# Patient Record
Sex: MALE | Race: WHITE | NOT HISPANIC OR LATINO | Employment: OTHER | URBAN - METROPOLITAN AREA
[De-identification: names, ages, dates, MRNs, and addresses within clinical notes are randomized per-mention and may not be internally consistent; named-entity substitution may affect disease eponyms.]

---

## 2017-01-30 ENCOUNTER — RECORDS - HEALTHEAST (OUTPATIENT)
Dept: LAB | Facility: CLINIC | Age: 77
End: 2017-01-30

## 2017-01-30 LAB
CHOLEST SERPL-MCNC: 134 MG/DL
FASTING STATUS PATIENT QL REPORTED: NORMAL
HDLC SERPL-MCNC: 42 MG/DL
LDLC SERPL CALC-MCNC: 77 MG/DL
TRIGL SERPL-MCNC: 74 MG/DL

## 2017-08-02 ENCOUNTER — RECORDS - HEALTHEAST (OUTPATIENT)
Dept: LAB | Facility: CLINIC | Age: 77
End: 2017-08-02

## 2017-08-02 LAB
CHOLEST SERPL-MCNC: 126 MG/DL
FASTING STATUS PATIENT QL REPORTED: ABNORMAL
HDLC SERPL-MCNC: 35 MG/DL
LDLC SERPL CALC-MCNC: 74 MG/DL
TRIGL SERPL-MCNC: 85 MG/DL

## 2018-01-05 ENCOUNTER — RECORDS - HEALTHEAST (OUTPATIENT)
Dept: LAB | Facility: CLINIC | Age: 78
End: 2018-01-05

## 2018-01-05 LAB — PSA SERPL-MCNC: 1.1 NG/ML (ref 0–6.5)

## 2018-01-10 ENCOUNTER — RECORDS - HEALTHEAST (OUTPATIENT)
Dept: LAB | Facility: CLINIC | Age: 78
End: 2018-01-10

## 2018-01-10 LAB
CHOLEST SERPL-MCNC: 132 MG/DL
FASTING STATUS PATIENT QL REPORTED: NORMAL
HDLC SERPL-MCNC: 40 MG/DL
LDLC SERPL CALC-MCNC: 76 MG/DL
TRIGL SERPL-MCNC: 82 MG/DL

## 2018-07-10 ENCOUNTER — RECORDS - HEALTHEAST (OUTPATIENT)
Dept: LAB | Facility: CLINIC | Age: 78
End: 2018-07-10

## 2018-07-10 LAB
ALBUMIN SERPL-MCNC: 3.9 G/DL (ref 3.5–5)
ALP SERPL-CCNC: 66 U/L (ref 45–120)
ALT SERPL W P-5'-P-CCNC: 35 U/L (ref 0–45)
ANION GAP SERPL CALCULATED.3IONS-SCNC: 7 MMOL/L (ref 5–18)
AST SERPL W P-5'-P-CCNC: 25 U/L (ref 0–40)
BILIRUB SERPL-MCNC: 1.2 MG/DL (ref 0–1)
BUN SERPL-MCNC: 28 MG/DL (ref 8–28)
CALCIUM SERPL-MCNC: 9.6 MG/DL (ref 8.5–10.5)
CHLORIDE BLD-SCNC: 107 MMOL/L (ref 98–107)
CHOLEST SERPL-MCNC: 131 MG/DL
CO2 SERPL-SCNC: 26 MMOL/L (ref 22–31)
CREAT SERPL-MCNC: 0.85 MG/DL (ref 0.7–1.3)
FASTING STATUS PATIENT QL REPORTED: YES
GFR SERPL CREATININE-BSD FRML MDRD: >60 ML/MIN/1.73M2
GLUCOSE BLD-MCNC: 91 MG/DL (ref 70–125)
HDLC SERPL-MCNC: 41 MG/DL
LDLC SERPL CALC-MCNC: 75 MG/DL
POTASSIUM BLD-SCNC: 4.6 MMOL/L (ref 3.5–5)
PROT SERPL-MCNC: 6.8 G/DL (ref 6–8)
SODIUM SERPL-SCNC: 140 MMOL/L (ref 136–145)
TRIGL SERPL-MCNC: 76 MG/DL

## 2018-12-13 ENCOUNTER — RECORDS - HEALTHEAST (OUTPATIENT)
Dept: ADMINISTRATIVE | Facility: OTHER | Age: 78
End: 2018-12-13

## 2018-12-13 ENCOUNTER — HOSPITAL ENCOUNTER (OUTPATIENT)
Dept: CT IMAGING | Facility: HOSPITAL | Age: 78
Discharge: HOME | End: 2018-12-13
Attending: FAMILY MEDICINE

## 2018-12-13 DIAGNOSIS — R42 VERTIGO: ICD-10-CM

## 2019-01-03 ENCOUNTER — RECORDS - HEALTHEAST (OUTPATIENT)
Dept: LAB | Facility: CLINIC | Age: 79
End: 2019-01-03

## 2019-01-03 LAB
ANION GAP SERPL CALCULATED.3IONS-SCNC: 9 MMOL/L (ref 5–18)
BUN SERPL-MCNC: 17 MG/DL (ref 8–28)
CALCIUM SERPL-MCNC: 9.6 MG/DL (ref 8.5–10.5)
CHLORIDE BLD-SCNC: 104 MMOL/L (ref 98–107)
CHOLEST SERPL-MCNC: 141 MG/DL
CO2 SERPL-SCNC: 27 MMOL/L (ref 22–31)
CREAT SERPL-MCNC: 0.85 MG/DL (ref 0.7–1.3)
FASTING STATUS PATIENT QL REPORTED: NORMAL
GFR SERPL CREATININE-BSD FRML MDRD: >60 ML/MIN/1.73M2
GLUCOSE BLD-MCNC: 88 MG/DL (ref 70–125)
HDLC SERPL-MCNC: 42 MG/DL
LDLC SERPL CALC-MCNC: 80 MG/DL
POTASSIUM BLD-SCNC: 4.2 MMOL/L (ref 3.5–5)
SODIUM SERPL-SCNC: 140 MMOL/L (ref 136–145)
TRIGL SERPL-MCNC: 93 MG/DL

## 2019-01-10 ENCOUNTER — RECORDS - HEALTHEAST (OUTPATIENT)
Dept: LAB | Facility: CLINIC | Age: 79
End: 2019-01-10

## 2019-01-10 LAB — TSH SERPL DL<=0.005 MIU/L-ACNC: 1.02 UIU/ML (ref 0.3–5)

## 2019-08-02 ENCOUNTER — RECORDS - HEALTHEAST (OUTPATIENT)
Dept: LAB | Facility: CLINIC | Age: 79
End: 2019-08-02

## 2019-08-02 LAB
ANION GAP SERPL CALCULATED.3IONS-SCNC: 5 MMOL/L (ref 5–18)
BUN SERPL-MCNC: 26 MG/DL (ref 8–28)
CALCIUM SERPL-MCNC: 10.1 MG/DL (ref 8.5–10.5)
CHLORIDE BLD-SCNC: 104 MMOL/L (ref 98–107)
CHOLEST SERPL-MCNC: 125 MG/DL
CO2 SERPL-SCNC: 30 MMOL/L (ref 22–31)
CREAT SERPL-MCNC: 0.99 MG/DL (ref 0.7–1.3)
FASTING STATUS PATIENT QL REPORTED: ABNORMAL
GFR SERPL CREATININE-BSD FRML MDRD: >60 ML/MIN/1.73M2
GLUCOSE BLD-MCNC: 93 MG/DL (ref 70–125)
HDLC SERPL-MCNC: 36 MG/DL
LDLC SERPL CALC-MCNC: 71 MG/DL
POTASSIUM BLD-SCNC: 4.8 MMOL/L (ref 3.5–5)
SODIUM SERPL-SCNC: 139 MMOL/L (ref 136–145)
TRIGL SERPL-MCNC: 92 MG/DL

## 2020-02-19 ENCOUNTER — RECORDS - HEALTHEAST (OUTPATIENT)
Dept: LAB | Facility: CLINIC | Age: 80
End: 2020-02-19

## 2020-02-19 LAB
ANION GAP SERPL CALCULATED.3IONS-SCNC: 10 MMOL/L (ref 5–18)
BUN SERPL-MCNC: 20 MG/DL (ref 8–28)
CALCIUM SERPL-MCNC: 9.7 MG/DL (ref 8.5–10.5)
CHLORIDE BLD-SCNC: 100 MMOL/L (ref 98–107)
CHOLEST SERPL-MCNC: 138 MG/DL
CO2 SERPL-SCNC: 26 MMOL/L (ref 22–31)
CREAT SERPL-MCNC: 0.9 MG/DL (ref 0.7–1.3)
FASTING STATUS PATIENT QL REPORTED: YES
GFR SERPL CREATININE-BSD FRML MDRD: >60 ML/MIN/1.73M2
GLUCOSE BLD-MCNC: 90 MG/DL (ref 70–125)
HDLC SERPL-MCNC: 39 MG/DL
HGB BLD-MCNC: 13.5 G/DL (ref 14–18)
LDLC SERPL CALC-MCNC: 80 MG/DL
POTASSIUM BLD-SCNC: 4.2 MMOL/L (ref 3.5–5)
SODIUM SERPL-SCNC: 136 MMOL/L (ref 136–145)
TRIGL SERPL-MCNC: 93 MG/DL

## 2020-04-10 ENCOUNTER — APPOINTMENT (OUTPATIENT)
Dept: CT IMAGING | Facility: CLINIC | Age: 80
DRG: 086 | End: 2020-04-10
Payer: MEDICARE

## 2020-04-10 ENCOUNTER — HOSPITAL ENCOUNTER (INPATIENT)
Facility: CLINIC | Age: 80
LOS: 1 days | Discharge: HOME | DRG: 086 | End: 2020-04-11
Attending: EMERGENCY MEDICINE | Admitting: SURGERY
Payer: MEDICARE

## 2020-04-10 DIAGNOSIS — S06.5XAA SUBDURAL HEMATOMA (H): ICD-10-CM

## 2020-04-10 DIAGNOSIS — S06.5XAA SDH (SUBDURAL HEMATOMA) (H): Primary | ICD-10-CM
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
## 2020-04-10 DIAGNOSIS — S20.212A RIB CONTUSION, LEFT, INITIAL ENCOUNTER: ICD-10-CM

## 2020-04-10 LAB — GLUCOSE BLDC GLUCOMTR-MCNC: 135 MG/DL (ref 70–99)

## 2020-04-10 PROCEDURE — 70450 CT HEAD/BRAIN W/O DYE: CPT

## 2020-04-10 PROCEDURE — 99221 1ST HOSP IP/OBS SF/LOW 40: CPT | Mod: GC | Performed by: INTERNAL MEDICINE

## 2020-04-10 PROCEDURE — 99285 EMERGENCY DEPT VISIT HI MDM: CPT | Mod: 25

## 2020-04-10 PROCEDURE — 00000146 ZZHCL STATISTIC GLUCOSE BY METER IP

## 2020-04-10 PROCEDURE — 25000132 ZZH RX MED GY IP 250 OP 250 PS 637: Mod: GY | Performed by: STUDENT IN AN ORGANIZED HEALTH CARE EDUCATION/TRAINING PROGRAM

## 2020-04-10 PROCEDURE — 25800030 ZZH RX IP 258 OP 636: Performed by: SURGERY

## 2020-04-10 PROCEDURE — 87641 MR-STAPH DNA AMP PROBE: CPT | Performed by: SURGERY

## 2020-04-10 PROCEDURE — 99291 CRITICAL CARE FIRST HOUR: CPT | Mod: Z6 | Performed by: EMERGENCY MEDICINE

## 2020-04-10 PROCEDURE — 87640 STAPH A DNA AMP PROBE: CPT | Performed by: SURGERY

## 2020-04-10 PROCEDURE — 20000004 ZZH R&B ICU UMMC

## 2020-04-10 RX ORDER — NALOXONE HYDROCHLORIDE 0.4 MG/ML
.1-.4 INJECTION, SOLUTION INTRAMUSCULAR; INTRAVENOUS; SUBCUTANEOUS
Status: DISCONTINUED | OUTPATIENT
Start: 2020-04-10 | End: 2020-04-11 | Stop reason: HOSPADM

## 2020-04-10 RX ORDER — HYDROCHLOROTHIAZIDE 12.5 MG/1
12.5 TABLET ORAL DAILY
COMMUNITY

## 2020-04-10 RX ORDER — MULTIPLE VITAMINS W/ MINERALS TAB 9MG-400MCG
1 TAB ORAL EVERY EVENING
Status: DISCONTINUED | OUTPATIENT
Start: 2020-04-10 | End: 2020-04-11 | Stop reason: HOSPADM

## 2020-04-10 RX ORDER — INDOMETHACIN 50 MG/1
50 CAPSULE ORAL PRN
COMMUNITY

## 2020-04-10 RX ORDER — ATORVASTATIN CALCIUM 20 MG/1
20 TABLET, FILM COATED ORAL DAILY
COMMUNITY

## 2020-04-10 RX ORDER — ASCORBIC ACID 500 MG
500 TABLET ORAL EVERY EVENING
Status: DISCONTINUED | OUTPATIENT
Start: 2020-04-10 | End: 2020-04-11 | Stop reason: HOSPADM

## 2020-04-10 RX ORDER — MULTIPLE VITAMINS W/ MINERALS TAB 9MG-400MCG
1 TAB ORAL DAILY
COMMUNITY

## 2020-04-10 RX ORDER — ONDANSETRON 4 MG/1
4 TABLET, ORALLY DISINTEGRATING ORAL EVERY 6 HOURS PRN
Status: DISCONTINUED | OUTPATIENT
Start: 2020-04-10 | End: 2020-04-11 | Stop reason: HOSPADM

## 2020-04-10 RX ORDER — MULTIVIT-MIN/IRON/FOLIC ACID/K 18-600-40
1 CAPSULE ORAL DAILY
COMMUNITY

## 2020-04-10 RX ORDER — TIMOLOL 5 MG/ML
1 SOLUTION/ DROPS OPHTHALMIC DAILY
COMMUNITY

## 2020-04-10 RX ORDER — ONDANSETRON 2 MG/ML
4 INJECTION INTRAMUSCULAR; INTRAVENOUS EVERY 6 HOURS PRN
Status: DISCONTINUED | OUTPATIENT
Start: 2020-04-10 | End: 2020-04-11 | Stop reason: HOSPADM

## 2020-04-10 RX ORDER — ATORVASTATIN CALCIUM 20 MG/1
20 TABLET, FILM COATED ORAL AT BEDTIME
Status: DISCONTINUED | OUTPATIENT
Start: 2020-04-10 | End: 2020-04-11 | Stop reason: HOSPADM

## 2020-04-10 RX ORDER — VERAPAMIL HYDROCHLORIDE 180 MG/1
360 CAPSULE, EXTENDED RELEASE ORAL DAILY
COMMUNITY

## 2020-04-10 RX ORDER — SODIUM CHLORIDE 9 MG/ML
INJECTION, SOLUTION INTRAVENOUS CONTINUOUS
Status: DISCONTINUED | OUTPATIENT
Start: 2020-04-10 | End: 2020-04-11

## 2020-04-10 RX ADMIN — OXYCODONE HYDROCHLORIDE AND ACETAMINOPHEN 500 MG: 500 TABLET ORAL at 23:01

## 2020-04-10 RX ADMIN — MULTIPLE VITAMINS W/ MINERALS TAB 1 TABLET: TAB at 23:00

## 2020-04-10 RX ADMIN — SODIUM CHLORIDE: 9 INJECTION, SOLUTION INTRAVENOUS at 23:48

## 2020-04-10 RX ADMIN — ATORVASTATIN CALCIUM 20 MG: 20 TABLET, FILM COATED ORAL at 23:00

## 2020-04-10 ASSESSMENT — MIFFLIN-ST. JEOR: SCORE: 1618.25

## 2020-04-10 NOTE — ED PROVIDER NOTES
History     Chief Complaint   Patient presents with     Head Injury     known subdural hematoma from fall earlier this afternoon     HPI  Renan Lemus is a 79 year old male with a past medical history of atrial fibrillation who presents to the emergency department with a chief complaint of fall.  The patient was previously seen at [facility] emergency department after a fall from standing height today while raking leaves.  The patient reportedly landed on his left side and did hit his head.  No loss of consciousness reported.  The patient is anticoagulated on Eliquis due to a history of atrial fibrillation.  The patient was noted to have some left-sided rib pain and mild head pain.  Neurologic exam was normal at [facility] and patient was noted to be alert and oriented without nausea or vomiting.  However, a head CT revealed a small (5 mm) subdural hematoma.  CT of the abdomen and pelvis and FAST exam revealed no additional injuries.  The patient has no additional complaints at this time.  Neurosurgery/trauma service were not notified prior to transfer.  Blood pressure 136/70.  Heart rate 98, patient noted to have SPO2 of 99% on room air.  No medications were given prior to his arrival here.  Labs were noted to be unremarkable.    Last dose of Eliquis was at 8 AM today.    I have reviewed the Medications, Allergies, Past Medical and Surgical History, and Social History in the Epic system.    No past medical history on file.  No past surgical history on file.  No current facility-administered medications for this encounter.      No current outpatient medications on file.     Allergies not on file  Past medical history, past surgical history, medications, and allergies were reviewed with the patient. Additional pertinent items: None    Social History     Socioeconomic History     Marital status: Not on file     Spouse name: Not on file     Number of children: Not on file     Years of education: Not on file      Highest education level: Not on file   Occupational History     Not on file   Social Needs     Financial resource strain: Not on file     Food insecurity     Worry: Not on file     Inability: Not on file     Transportation needs     Medical: Not on file     Non-medical: Not on file   Tobacco Use     Smoking status: Not on file   Substance and Sexual Activity     Alcohol use: Not on file     Drug use: Not on file     Sexual activity: Not on file   Lifestyle     Physical activity     Days per week: Not on file     Minutes per session: Not on file     Stress: Not on file   Relationships     Social connections     Talks on phone: Not on file     Gets together: Not on file     Attends [employer] service: Not on file     Active member of club or organization: Not on file     Attends meetings of clubs or organizations: Not on file     Relationship status: Not on file     Intimate partner violence     Fear of current or ex partner: Not on file     Emotionally abused: Not on file     Physically abused: Not on file     Forced sexual activity: Not on file   Other Topics Concern     Not on file   Social History Narrative     Not on file     Social history was reviewed with the patient. Additional pertinent items: None    Review of Systems  General: No fevers or chills  Skin: No rash or diaphoresis  Eyes: No eye redness or discharge  Ears/Nose/Throat: No rhinorrhea or nasal congestion  Respiratory: No cough or SOB  Cardiovascular: No chest pain or palpitations  Gastrointestinal: No nausea, vomiting, or diarrhea  Genitourinary: No urinary frequency, hematuria, or dysuria  Musculoskeletal: No arthralgias or myalgias  Neurologic: No numbness or weakness  Psychiatric: No depression or SI  Hematologic/Lymphatic/Immunologic: No leg swelling, no easy bruising/bleeding  Endocrine: No polyuria/polydypsia    A complete review of systems was performed with pertinent positives and negatives noted in the HPI, and all other systems  negative.    Physical Exam   BP: (!) 140/81  Heart Rate: 103  Temp: 98.8  F (37.1  C)  SpO2: 98 %      General: Well nourished, well developed, NAD  HEENT: EOMI, anicteric. NCAT, MMM  Neck: no jugular venous distension, supple, nl ROM  Cardiac: Regular rate and rhythm. No murmurs, rubs, or gallops. Normal S1, S2.  Intact peripheral pulses  Pulm: CTAB, no stridor, wheezes, rales, rhonchi  Abd: Soft, nontender, nondistended.  No masses palpated.    Skin: Warm and dry to the touch.  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 PERRL, EOMI, visual fields intact on right, impaired on left due to left eye vision deficit (at baseline per patient).  No pronator drift, no lower extremity drift.  Speech is clear without aphasia or dysarthria.      ED Course        Procedures                Critical Care Addendum    My initial assessment, based on my review of prehospital provider report, review of nursing observations and and CT results, established that Renan Lemus has severe traumatic injuries, which requires immediate intervention, and therefore he is critically ill.     After the initial assessment, the care team consulted with neurosurgery team to provide stabilization care. Due to the critical nature of this patient, I reassessed nursing observations, vital signs, physical exam, mental status and neurologic status multiple times prior to his disposition.     Time also spent performing documentation, reviewing test results, discussion with consultants and coordination of care.     Critical care time (excluding teaching time and procedures): 35  minutes.   Trauma:  Level of trauma activation: Trauma evaluation (consult) called at 1900  C-collar and immobilization: not indicated, cleared.  CSpine Clearance: Images at outside hospital  GCS at arrival: 15  GCS at disposition: unchanged  Full Primary and Secondary survey with appropriate immobilization of spine completed in exam section.  Consults prior to admission or transfer: Neurosurgery called at 1900  Procedures done in the ED: none          Labs Ordered and Resulted from Time of ED Arrival Up to the Time of Departure from the ED - No data to display         No results found for this or any previous visit (from the past 24 hour(s)).    Labs, vital signs, and imaging studies were reviewed by me.    Medications - No data to display    Assessments & Plan (with Medical Decision Making)   Renan Lemus is a 79 year old male who presents to the emergency department after a fall.  Found to have a 5 mm subdural hematoma at [facility] emergency department. Neurologic exam is at patient's baseline. BP on arrival 140/81.    CD of images sent by [facility] ER, this will be uploaded into our system.     1907 patient was discussed with neurosurgery service, they will come evaluate the patient in the emergency department.    1918 patient was discussed with trauma surgery, they will come evaluate the patient in the emergency department.    I have reviewed the nursing notes.    I have reviewed the findings, diagnosis, plan and need for follow up with the patient.    2038 Patient discussed with trauma service, to be admitted to their service for further management. Plan was discussed with patient who understands and agrees with plan.     New Prescriptions    No medications on file       Final diagnoses:   Subdural hematoma (H)   Fall, initial encounter   Activities involving raking leaves   Rib contusion, left, initial encounter       4/10/2020   [facility], [place], EMERGENCY DEPARTMENT     Nathalie Mesa MD  04/10/20  2038

## 2020-04-10 NOTE — ED NOTES
Bed: ED23  Expected date: 4/10/20  Expected time: 6:45 PM  Means of arrival:   Comments:  Renan Lemus 79 M  [birth year] [facility] while raking leaves, landed on L side, has small SDH, neuro intact, minimal HA, normal neuro exam, rib pain, FAST and CT neg, no other injuries. Tx from [facility]    5 mm SDH. Takes Eliquis. No LOC. 136/70, HR 98 99% on RA, Alert and oriented, no N/V, labs stable. 18 g left AC. No meds given.

## 2020-04-10 NOTE — ED TRIAGE NOTES
Pt BIBA from [place]. Doing yard work around noon. Fell hit head on rocks. Takes eliquis. Went into hospital. Found subdural hematoma. No loss of consciousness. No neuro changes. No nausea/dizziness. Small amount of bleeding on back of head. BPs 140s systolic. Alert and oriented. A-fib.

## 2020-04-11 ENCOUNTER — APPOINTMENT (OUTPATIENT)
Dept: CT IMAGING | Facility: CLINIC | Age: 80
DRG: 086 | End: 2020-04-11
Payer: MEDICARE

## 2020-04-11 VITALS
OXYGEN SATURATION: 94 % | WEIGHT: 197.75 LBS | HEIGHT: 70 IN | SYSTOLIC BLOOD PRESSURE: 132 MMHG | HEART RATE: 79 BPM | RESPIRATION RATE: 16 BRPM | DIASTOLIC BLOOD PRESSURE: 64 MMHG | BODY MASS INDEX: 28.31 KG/M2 | TEMPERATURE: 98.1 F

## 2020-04-11 LAB
ANION GAP SERPL CALCULATED.3IONS-SCNC: 6 MMOL/L (ref 3–14)
BUN SERPL-MCNC: 18 MG/DL (ref 7–30)
CALCIUM SERPL-MCNC: 8.8 MG/DL (ref 8.5–10.1)
CHLORIDE SERPL-SCNC: 102 MMOL/L (ref 94–109)
CO2 SERPL-SCNC: 26 MMOL/L (ref 20–32)
CREAT SERPL-MCNC: 0.96 MG/DL (ref 0.66–1.25)
GFR SERPL CREATININE-BSD FRML MDRD: 75 ML/MIN/{1.73_M2}
GLUCOSE SERPL-MCNC: 124 MG/DL (ref 70–99)
MRSA DNA SPEC QL NAA+PROBE: NEGATIVE
POTASSIUM SERPL-SCNC: 3.4 MMOL/L (ref 3.4–5.3)
RADIOLOGIST FLAGS: ABNORMAL
SODIUM SERPL-SCNC: 133 MMOL/L (ref 133–144)
SPECIMEN SOURCE: NORMAL

## 2020-04-11 PROCEDURE — 25000132 ZZH RX MED GY IP 250 OP 250 PS 637: Mod: GY | Performed by: NURSE PRACTITIONER

## 2020-04-11 PROCEDURE — 25000125 ZZHC RX 250: Performed by: STUDENT IN AN ORGANIZED HEALTH CARE EDUCATION/TRAINING PROGRAM

## 2020-04-11 PROCEDURE — 40000893 ZZH STATISTIC PT IP EVAL DEFER

## 2020-04-11 PROCEDURE — 80048 BASIC METABOLIC PNL TOTAL CA: CPT | Performed by: STUDENT IN AN ORGANIZED HEALTH CARE EDUCATION/TRAINING PROGRAM

## 2020-04-11 PROCEDURE — 70450 CT HEAD/BRAIN W/O DYE: CPT

## 2020-04-11 PROCEDURE — 25000132 ZZH RX MED GY IP 250 OP 250 PS 637: Mod: GY | Performed by: STUDENT IN AN ORGANIZED HEALTH CARE EDUCATION/TRAINING PROGRAM

## 2020-04-11 PROCEDURE — 99233 SBSQ HOSP IP/OBS HIGH 50: CPT | Performed by: NURSE PRACTITIONER

## 2020-04-11 PROCEDURE — 36415 COLL VENOUS BLD VENIPUNCTURE: CPT | Performed by: STUDENT IN AN ORGANIZED HEALTH CARE EDUCATION/TRAINING PROGRAM

## 2020-04-11 RX ORDER — PROPOFOL 10 MG/ML
INJECTION, EMULSION INTRAVENOUS
Status: DISCONTINUED
Start: 2020-04-11 | End: 2020-04-11 | Stop reason: CLARIF

## 2020-04-11 RX ORDER — ACETAMINOPHEN 325 MG/1
650 TABLET ORAL EVERY 4 HOURS PRN
Status: DISCONTINUED | OUTPATIENT
Start: 2020-04-11 | End: 2020-04-11 | Stop reason: HOSPADM

## 2020-04-11 RX ORDER — INDOMETHACIN 50 MG/1
50 CAPSULE ORAL DAILY
Status: DISCONTINUED | OUTPATIENT
Start: 2020-04-11 | End: 2020-04-11

## 2020-04-11 RX ORDER — VERAPAMIL HYDROCHLORIDE 40 MG/1
40 TABLET ORAL EVERY 6 HOURS
Status: DISCONTINUED | OUTPATIENT
Start: 2020-04-11 | End: 2020-04-11 | Stop reason: HOSPADM

## 2020-04-11 RX ORDER — VITAMIN B COMPLEX
125 TABLET ORAL DAILY
Status: DISCONTINUED | OUTPATIENT
Start: 2020-04-11 | End: 2020-04-11 | Stop reason: HOSPADM

## 2020-04-11 RX ORDER — TIMOLOL MALEATE 5 MG/ML
1 SOLUTION/ DROPS OPHTHALMIC DAILY
Status: DISCONTINUED | OUTPATIENT
Start: 2020-04-11 | End: 2020-04-11 | Stop reason: HOSPADM

## 2020-04-11 RX ORDER — ACETAMINOPHEN 325 MG/1
650 TABLET ORAL EVERY 4 HOURS PRN
COMMUNITY
Start: 2020-04-11

## 2020-04-11 RX ORDER — HYDROCHLOROTHIAZIDE 12.5 MG/1
12.5 TABLET ORAL DAILY
Status: DISCONTINUED | OUTPATIENT
Start: 2020-04-11 | End: 2020-04-11 | Stop reason: HOSPADM

## 2020-04-11 RX ADMIN — MELATONIN 125 MCG: at 08:15

## 2020-04-11 RX ADMIN — VERAPAMIL HYDROCHLORIDE 40 MG: 40 TABLET, FILM COATED ORAL at 10:45

## 2020-04-11 RX ADMIN — TIMOLOL MALEATE 1 DROP: 5 SOLUTION OPHTHALMIC at 08:15

## 2020-04-11 RX ADMIN — HYDROCHLOROTHIAZIDE 12.5 MG: 12.5 TABLET ORAL at 10:45

## 2020-04-11 ASSESSMENT — ACTIVITIES OF DAILY LIVING (ADL)
RETIRED_COMMUNICATION: 0-->UNDERSTANDS/COMMUNICATES WITHOUT DIFFICULTY
DRESS: 0-->INDEPENDENT
TRANSFERRING: 0-->INDEPENDENT
ADLS_ACUITY_SCORE: 13
ADLS_ACUITY_SCORE: 13
FALL_HISTORY_WITHIN_LAST_SIX_MONTHS: NO
ADLS_ACUITY_SCORE: 15
AMBULATION: 0-->INDEPENDENT
TOILETING: 0-->INDEPENDENT
ADLS_ACUITY_SCORE: 15
COGNITION: 0 - NO COGNITION ISSUES REPORTED
BATHING: 0-->INDEPENDENT
RETIRED_EATING: 0-->INDEPENDENT
SWALLOWING: 0-->SWALLOWS FOODS/LIQUIDS WITHOUT DIFFICULTY

## 2020-04-11 NOTE — DISCHARGE SUMMARY
[facility], [place]    Discharge Summary  Trauma Surgery Service    Date of Admission:  4/10/2020  Date of Discharge:  4/11/2020  Attending Physician: MD Tico  Discharging Provider: Rashel Mike CNP  Date of Service (when I saw the patient): 04/11/20    Primary Provider: Benson Christianson  Primary Care clinic: [facility] [address]  [facility] [address]  Phone: [phone]  Fax number: [phone]     Discharge Diagnoses   Small Left SDH    Tertiary exam completed today, no additional injuries found.     Hospital Course   Renan Lemus is a 79 year old male with a past medical history of atrial fibrillation on Eliquis presented to [facility] ER as a transfer from [facility] for trauma evaluation.  The patient had a fall while raking leaves and slipped and landed on his left side and did hit his head. Denies any loss of consciousness. He went inside the house and mentioned to his wife but he said he felt fine. He became concerned due to his anticoagulant use and decided to go to [facility] for evaluation.  He was noted to be neurologically intact with GCS 15. Work-up evaluation included a head CT that revealed a small (5 mm) subdural hematoma, CT abdomen/pelvis and FAST exam were negative. He  remained hemodynamically stable before and during his transfer. No medications were given prior to his arrival here.   Repeat head CT this morning was negative for change, and neurosurgery has reviewed the imaging and feel he is able to leave to go home.     Neurosurgery Head Injury /bleed:  Renan Lemus was evaluated by Neurosurgery and was managed non-operatively.  He had repeat imaging of SDH head injury which showed stability. He was monitored with serial neurological examinations which remained stable.  Neurosurgery recommends follow up in Neurosurgery clinic in 2 weeks with repeat head CT.  He was evaluated by physical therapies during his  hospitalization. Please see summary of most current therapy notes below.     Mr. Lemus should not take his Eliquis for two weeks. At the time of his Neurosurgery follow up, this will be revisited.     Mr. Lemus denied acute pain. He had prn acetaminophen available for use, and did not take any.       Therapy Recommendations:   Current status of physical therapies on discharge: Patient plan for discharge: Home  Current status: Up independently, at/near baseline level of function  Barriers to return to prior living situation: none from mobility standpoint  Recommendations for discharge: Home  Rationale for recommendations: At/near baseline level of function      Code Status   Full Code    SUBJECTIVE: Patient seen up in room and while seated in chair. Denies acute pain. Denies shortness of breath, difficulty with urination or mobilization.   ROS negative except as noted.    Physical Exam   Temp: 98.1  F (36.7  C) Temp src: Oral BP: (!) 148/81 Pulse: 77 Heart Rate: 76 Resp: 16 SpO2: 96 % O2 Device: None (Room air)    Vitals:    04/10/20 2216   Weight: 89.7 kg (197 lb 12 oz)     Vital Signs with Ranges  Temp:  [98.1  F (36.7  C)-98.9  F (37.2  C)] 98.1  F (36.7  C)  Pulse:  [] 77  Heart Rate:  [] 76  Resp:  [6-20] 16  BP: (113-168)/(51-90) 148/81  SpO2:  [94 %-99 %] 96 %  I/O last 3 completed shifts:  In: 655 [P.O.:190; I.V.:465]  Out: 250 [Urine:250]    Constitutional: Awake, alert, cooperative, no apparent distress, and appears stated age.  Eyes: Lids and lashes normal, pupils equal, round and reactive to light, extra ocular muscles intact, sclera clear, conjunctiva normal.  ENT: Normocephalic, without obvious abnormality, atraumatic, sinuses nontender on palpation, external ears without lesions, oral pharynx with moist mucus membranes  Respiratory: No increased work of breathing, good air exchange, clear to auscultation bilaterally, no crackles or wheezing.  Cardiovascular: Normal apical impulse,  "regular rate and rhythm, normal S1 and S2, no S3 or S4, and no murmur noted.  GI:  normal bowel sounds, soft, non-distended, non-tender  Skin: No bruising or bleeding, normal skin color, texture, turgor, no redness, warmth, or swelling, no rashes  Musculoskeletal: There is no redness, warmth, or swelling of the joints.  Full range of motion noted.  Motor strength is 5 out of 5 all extremities bilaterally.  Tone is normal.  Neurologic: Awake, alert, oriented to name, place and time.  Cranial nerves II-XII are grossly intact.  Motor is 5 out of 5 bilaterally. gait is normal.   Neuropsychiatric: Calm, normal eye contact, alert, normal affect, oriented to self, place, time and situation, memory for past and recent events intact and thought process normal.    Discharge Disposition   Discharged to home  Condition at discharge: Good  Discharge VS: Blood pressure (!) 148/81, pulse 77, temperature 98.1  F (36.7  C), temperature source Oral, resp. rate 16, height 1.778 m (5' 10\"), weight 89.7 kg (197 lb 12 oz), SpO2 96 %.    Consultations This Hospital Stay   MEDICATION HISTORY IP PHARMACY CONSULT  PHYSICAL THERAPY ADULT IP CONSULT    Discharge Orders      Reason for your hospital stay    fall     Adult [facility] Follow-up and recommended labs and tests    Follow up with your primary care provider for continued medical care and hospital follow up in 5-10 days.        Neurosurgery Clinic Follow up in 2 weeks with a repeat head CT. Hold Eliquis until then.  [facility]  [address]oor [address]   [address] [place] [address]  Appointments: [phone]    Trauma Clinic- as needed  [facility]  [address]oor [address]  [address] [place] [address]   Appointments: [phone]      You have been involved in a recent trauma incident resulting in an injury.  Studies show us that people affected by trauma have higher levels of post-traumatic stress disorder (PTSD) and/or depressive symptoms " "during the year following an injury.     Please consider the following.  Have you:  Had migraines about the event(s) or thought about the event(s) when you didn't want to?  Tried hard not to think about the event(s) or went out of your way to avoid situations that reminded you of the event(s)?  Been constantly on guard, watchful, or easily startled?  Felt numb or detached from people, activities, or your surroundings?   Felt guilty or unable to stop blaming yourself or others for the event(s) or any problems the event (s) may have caused?    If you answered \"yes\" to 3 or more of these questions, or if you simply want to discuss any of your feelings further, we recommend that you talk with your Primary Care Provider or a mental health professional.     Activity    Your activity upon discharge: activity as tolerated     When to contact your care team    Call your primary doctor if you have any of the following: increasing confusion, shortness of breath, increasing pain     Full Code     Diet    Follow this diet upon discharge: Orders Placed This Encounter      Regular Diet Adult     Discharge Medications   Current Discharge Medication List      START taking these medications    Details   acetaminophen (TYLENOL) 325 MG tablet Take 2 tablets (650 mg) by mouth every 4 hours as needed for mild pain or fever  Qty:      Associated Diagnoses: SDH (subdural hematoma) (H)         CONTINUE these medications which have NOT CHANGED    Details   Ascorbic Acid (VITAMIN C) 500 MG CAPS Take 1 tablet by mouth daily       atorvastatin (LIPITOR) 20 MG tablet Take 20 mg by mouth daily      CALCIUM PO Take 1 tablet by mouth daily      hydrochlorothiazide (HYDRODIURIL) 12.5 MG tablet Take 12.5 mg by mouth daily      indomethacin (INDOCIN) 50 MG capsule Take 50 mg by mouth as needed for moderate pain (gout)      multivitamin w/minerals (MULTI-VITAMIN) tablet Take 1 tablet by mouth daily      timolol, PF, (TIMOPTIC OCUDOSE) 0.5 % ophthalmic " solution Apply 1 drop to eye daily       verapamil ER (VERELAN) 180 MG 24 hr capsule Take 360 mg by mouth daily          STOP taking these medications       apixaban ANTICOAGULANT (ELIQUIS) 5 MG tablet Comments:   Reason for Stopping:             Allergies   No Known Allergies  Data   Most Recent 3 CBC's:No lab results found.   Most Recent 3 BMP's:  Recent Labs   Lab Test 04/11/20  0301      POTASSIUM 3.4   CHLORIDE 102   CO2 26   BUN 18   CR 0.96   ANIONGAP 6   SANDRA 8.8   *     Most Recent 2 LFT's:No lab results found.  Most Recent INR's and Anticoagulation Dosing History:  Anticoagulation Dose History     There is no flowsheet data to display.        Most Recent 3 Troponin's:No lab results found.  Most Recent 6 Bacteria Isolates From Any Culture (See EPIC Reports for Culture Details):No lab results found.  Most Recent TSH, T4 and A1c Labs:No lab results found.  Results for orders placed or performed during the hospital encounter of 04/10/20   CT Head w/o Contrast     Value    Radiologist flags (Urgent)    Narrative    CT HEAD W/O CONTRAST 4/10/2020 11:44 PM    Provided History: Head trauma, minor, pt on anticoagulation; trauma,  SDH on eliquis, follow up scan, eval for progression  ICD-10: Subdural hemorrhage    Comparison: Same-day head CT performed at outside institution,  comparison study is currently in the exceptions folder. Comparison  report is not currently available.    Technique: Using multidetector thin collimation helical acquisition  technique, axial, coronal and sagittal CT images from the skull base  to the vertex were obtained without intravenous contrast.     Findings:    Thin hyperattenuating subdural hematoma overlying the right cerebral  convexity measures up to 4 mm in maximal thickness (se 6, image 46).  This is slightly less pronounced than prior exam where this measured  up to 5 mm in maximal thickness. Small subdural hematoma along the  posterior right occipital lobe, posterior  falx and the right tentorium  (se 2, images 17 and 18). This very thin collection measures up to 3  mm in maximal thickness, was not seen on prior. Likely there has been  redistribution of right subdural hematoma.    Soft tissue hematoma overlying the left parietal bone and left  occiput, this is more pronounced than on prior exam.    Periventricular scattered hypoattenuation in supratentorial white  matter, chronic ischemic changes. Right thalamic and left basal  ganglia chronic lacunar infarctions. Slight prominent size of the  ventricles which are proportionate to the sulci. The gray to white  matter differentiation of the cerebral hemispheres is preserved. The  basal cisterns are patent.    The visualized paranasal sinuses are clear. The mastoid air cells are  clear. No acute osseous abnormalities.         Impression    Impression:   1. Thin hyperattenuating subdural hematoma overlying the right  cerebral convexity measures up to 4 mm in maximal thickness, this is  slightly less pronounced when compared with prior.  2. Newly visualized thin subdural hematoma overlying the posterior  falx, posterior occipital lobe, posterior right-sided falx and  right-sided tentorium which measures maximally up to 3 mm. This is  likely redistributed blood from previously present right parietal  subdural hematoma.  3. Slight increase in size of the subcutaneous hematoma overlying the  left parietal and occipital bones.      [Urgent Result:   Visualized subdural hematoma overlying the right posterior occipital  lobe, right falx and right tentorium]    Finding was identified on 4/10/2020 11:46 PM.     Dr. London was contacted by Dr. Mccormack at 4/10/2020 11:58 PM and  verbalized understanding of the urgent finding.     I have personally reviewed the examination and initial interpretation  and I agree with the findings.    CONCEPCIÓN CARDENAS MD   CT Head w/o Contrast    Narrative    CT HEAD W/O CONTRAST 4/11/2020 7:35 AM    Provided History:  Head trauma. No neuro decline. Follow-up.  ICD-10: Trauma    Comparison: Head CT dated 4/10/2020.    Technique: Using multidetector thin collimation helical acquisition  technique, axial, coronal and sagittal CT images from the skull base  to the vertex were obtained without intravenous contrast.     Findings:    Left parietal scalp hematoma is slightly increased in size. Subdural  hemorrhage measuring 3-4 mm in thickness along the right occipital  lobe,  posterior falx and right tentorium, unchanged. Right cerebral  convexity subdural hemorrhage measuring 3 mm in thickness. No new  hemorrhage. No midline shift. The ventricles are proportionate to the  cerebral sulci. The gray to white matter differentiation of the  cerebral hemispheres is preserved. The basal cisterns are patent. Mild  leukoaraiosis and mild generalized parenchyma volume loss.     Layering mucus in the left maxillary sinus. The mastoid air cells are  clear.       Impression    Impression:   1. No significant change in right occipital/ posterior falx/right  tentorial subdural hemorrhage.  2. Unchanged subdural hematoma overlying the right cerebral convexity.  3. Large left parietal scalp hematoma is slightly increased in size.  4. Chronic small vessel ischemic disease.    I have personally reviewed the examination and initial interpretation  and I agree with the findings.    CONCEPCIÓN CARDENAS MD       Time Spent on this Encounter   I, Rashel Mike NP, personally saw the patient today and spent less than or equal to 30 minutes discharging this patient.    We appreciate the opportunity to care for your patient while in the hospital.  Should you have any questions about their injuries or this discharge summary our contact information is below.    Trauma Services  [facility]   Department of Critical Care and Acute Care Surgery  [address] [place] [address]  Office: [phone]

## 2020-04-11 NOTE — PROGRESS NOTES
[facility]  Neurosurgery Progress Note    Subjective: no acute events    Objective:   Temp:  [98.3  F (36.8  C)-98.9  F (37.2  C)] 98.3  F (36.8  C)  Pulse:  [] 91  Heart Rate:  [] 91  Resp:  [6-20] 20  BP: (113-168)/(51-90) 139/82  SpO2:  [95 %-99 %] 99 %  No intake/output data recorded.    Gen: Appears comfortable, NAD  Left occipital scalp hematoma  Neurologic:  - Alert & Oriented to person, place, time, and situation  - Follows commands  - Speech fluent, spontaneous. No aphasia or dysarthria.  - diminished hearing left ear  - No gaze preference. No apparent hemineglect.  - EOMI, blind left eye  - Face symmetric with sensation intact to light touch  - Palate elevates symmetrically, uvula midline, tongue protrudes midline  - Trapezii and sternocleidomastoid muscles 5/5 bilaterally  - No pronator drift  - Strength: 5/5, except LUE 4+/5    LABS  No lab results found.    Recent Labs   Lab Test 04/11/20  0301      POTASSIUM 3.4   CHLORIDE 102   CO2 26   BUN 18   CR 0.96   ANIONGAP 6   SANDRA 8.8   *       IMAGING  Head CT 4/11/2020  Impression:   1. Thin hyperattenuating subdural hematoma overlying the right  cerebral convexity measures up to 4 mm in maximal thickness, this is  slightly less pronounced when compared with prior  2. Newly visualized thin subdural hematoma overlying the posterior  falx, posterior occipital lobe, posterior right-sided falx and  right-sided tentorium which measures maximally up to 3 mm.  3. Slight increase in size of the subcutaneous hematoma overlying the  left parietal and occipital bones.  4. Small amount of cerebral atrophy and chronic small vessel ischemic  disease.    Assessment: Renan Lemus is a 79 year old male on Eliquis who presents with a small traumatic R aSDH, which possibly continues to evolve.    Plan:  - Serial neuro exams  - Pain control  - NPO; if repeat CT scan stable, ok for diet  - repeat head CT this AM  - continue  to hold eliquis  - BP<140  - plt >100,000, INR <1.5      Tyson Patel MD  Neurosurgery Resident PGY-1    Please contact neurosurgery resident on call with questions.    Dial * * [phone], enter [phone] when prompted.     I have seen this patient with the resident and formulated a plan and agree with this note.  AMP

## 2020-04-11 NOTE — PLAN OF CARE
Major Shift Events: Patient arrived from [facility] after transfer from [facility], presenting with   Left head contusion and Subdural hematoma.  Patient is alert, oriented, follows commands  And able to stand, and pivot to bed.  Neuro exam remains at his baseline, with left eye blindness, left ear deafness, and residual left sided hemiplegia from previous stroke, years ago  Per MD note.  Report from [facility] stated he was awake and had been eating his dinner meal.  Upon arrival to , patient was assessed to swallow liquids with ease and passed an initial   Swallow study on unit.  Head CT was performed near 2300, approximately 6 hours after   Head CT at previous hospital showing some changes.  MD and radiology conferred and   Repeat scan scheduled for 0600.  Neuro exam remains unchanged. Patient has been  Up in room to commode with stand-by assist.  MIV at 75 ml/hr. Morning sodium level - 133. Skin double  Check assessment with charge nurse, Bea ALEGRIA RN  Plan: Holding Eliquis at this time.  Just sips/clear liquids overnight.  Head CT  0600 for  Followup.     Serial neuro checks.   For vital signs and complete assessments, please see documentation flowsheets.

## 2020-04-11 NOTE — H&P
SURGICAL ICU H&P  April 10, 2020      CO-MORBIDITIES:   Subdural hematoma (H)  Fall, initial encounter  Activities involving raking leaves  Rib contusion, left, initial encounter    ASSESSMENT: Renan Lemus is a 79 year old male on eliquis for A fib found to have a 5mm SDH after a GLF.    PLAN:  Neuro/ pain/ sedation:  - Monitor neurological status; notify the MD for any acute changes in exam  - Q1H neuro checks  - Repeat head CT at 11pm  - Neurosurgery following  - Normonatremia  -OSH imaging not available in PACS, unable to reach [facility] to push images and our radiology dept unable to access    Pulmonary care:   - Supplemental oxygen to keep saturation above 92%  - Incentive spirometer [id number] when awake      Cardiovascular:    - Monitor hemodynamic status    GI care:   - NPO except ice chips and medications    Fluids/ Electrolytes/ Nutrition:   - NS for IV fluid hydration, goal Na >135  - No indication for parenteral nutrition    Renal/ Fluid Balance:    - Urine output is  adequate so far  - Will continue to monitor intake and output    Endocrine:    - No management indication    ID/ Antibiotics:  - No indication for antibiotics     Heme:     - Hemoglobin stable     MSK:  Up ad juice    Prophylaxis:    - Mechanical prophylaxis for DVT   - No chemical DVT prophylaxis due to high risk of bleeding   -     Lines/ tubes/ drains:  - PIV    Disposition:  - Surgical ICU    Findings and plan discussed with surgical ICU staff Dr. Hall.    Haely London  General Surgery PGY3    - - - - - - - - - - - - - - - - - - - - - - - - - - - - - - - - - - - - - - - - - - - - - - - - - - - - - - - - -     PRIMARY TEAM: Trauma  PRIMARY PHYSICIAN: Dr. Doshi    REASON FOR CRITICAL CARE ADMISSION: Q1H neuroccks   ADMITTING PHYSICIAN: Dr. Hall    HISTORY PRESENTING ILLNESS: Renan Lemus is a 79 year old male with PMH CVA, a fib on eliquis, RCC s/p nephrectomy, L eye blindness, hearing loss who presents as a transfer  from [facility] after a ground level fall at noon today.  He was raking leaves and fell onto his left side.  Denies LOC.  No headache, dizziness, vision changes, new weakness or numbness.  L hip is sore.    REVIEW OF SYSTEMS: As noted above. No headache, dizziness. No fever, chills. No chest pain or shortness of breath. No abdominal pain, nausea, vomiting. No diarrhea or constipation. No urinary difficulties. No muscle or body aches.     PAST MEDICAL HISTORY:   A fib  CVA  RCC  L eye melanoma    SURGICAL HISTORY:   No past surgical history on file.    SOCIAL HISTORY:  Social History     Tobacco Use     Smoking status: Not on file   Substance Use Topics     Alcohol use: Not on file       FAMILY HISTORY:  No family history on file.     ALLERGIES:    No Known Allergies    MEDICATIONS:  No current facility-administered medications on file prior to encounter.   apixaban ANTICOAGULANT (ELIQUIS) 5 MG tablet, Take 5 mg by mouth 2 times daily  Ascorbic Acid (VITAMIN C) 500 MG CAPS, Take 1 tablet by mouth daily   atorvastatin (LIPITOR) 20 MG tablet, Take 20 mg by mouth daily  CALCIUM PO, Take 1 tablet by mouth daily  hydrochlorothiazide (HYDRODIURIL) 12.5 MG tablet, Take 12.5 mg by mouth daily  indomethacin (INDOCIN) 50 MG capsule, Take 50 mg by mouth as needed for moderate pain (gout)  multivitamin w/minerals (MULTI-VITAMIN) tablet, Take 1 tablet by mouth daily  timolol, PF, (TIMOPTIC OCUDOSE) 0.5 % ophthalmic solution, Apply 1 drop to eye daily   verapamil ER (VERELAN) 180 MG 24 hr capsule, Take 360 mg by mouth daily         PHYSICAL EXAMINATION:  Temp:  [98.8  F (37.1  C)-98.9  F (37.2  C)] 98.9  F (37.2  C)  Pulse:  [] 116  Heart Rate:  [] 116  Resp:  [6-20] 18  BP: (119-168)/(63-90) 129/63  SpO2:  [95 %-99 %] 97 %  General: Alert, well-appearing  in no acute distress.  HEENT: Normocephalic, hematoma L parietal scalp  Neck: No cervical lymphadenopathy. No C spine tenderness  Chest wall: Symmetric thorax. No  masses or tenderness to palpation.   Respiratory: Non-labored breathing. Lung sounds clear to auscultation bilaterally.   Cardiovascular: Regular rate and rhythm.   Gastrointestinal: Abdomen soft, non-distended.   Extremities: No limb deformities. No pedal edema.   Skin: As noted above. No rashes or lesions appreciated.    LABS: Reviewed.   Arterial Blood Gases   No lab results found in last 7 days.  Complete Blood Count   No lab results found in last 7 days.  Basic Metabolic Panel  No lab results found in last 7 days.  Liver Function Tests  No lab results found in last 7 days.  Pancreatic Enzymes  No lab results found in last 7 days.  Coagulation Profile  No lab results found in last 7 days.  Lactate  Invalid input(s): LACTATE    IMAGING:  OSH imaging not available

## 2020-04-11 NOTE — ED NOTES
General acute hospital, [place]   ED Nurse to Floor Handoff     Renan Lemus is a 79 year old male who speaks English and lives with a spouse,  in a home  They arrived in the ED by ambulance from home    ED Chief Complaint: Head Injury (known subdural hematoma from fall earlier this afternoon)    ED Dx;   Final diagnoses:   Subdural hematoma (H)   Fall, initial encounter   Activities involving raking leaves   Rib contusion, left, initial encounter         Needed?: No    Allergies: No Known Allergies.  Past Medical Hx: No past medical history on file.   Baseline Mental status: WDL  Current Mental Status changes: at basesline    Infection present or suspected this encounter: no  Sepsis suspected: No  Isolation type: No active isolations     Activity level - Baseline/Home:  Independent  Activity Level - Current:   Independent    Bariatric equipment needed?: No    In the ED these meds were given: Medications - No data to display    Drips running?  No    Home pump  No    Current LDAs  Peripheral IV 04/10/20 Left Upper arm (Active)   Number of days: 0       Labs results: Labs Ordered and Resulted from Time of ED Arrival Up to the Time of Departure from the ED - No data to display    Imaging Studies: No results found for this or any previous visit (from the past 24 hour(s)).    Recent vital signs:   BP (!) 148/77   Pulse 86   Temp 98.8  F (37.1  C) (Oral)   Resp 11   SpO2 97%     [place] Coma Scale Score: 15 (04/10/20 1945)       Cardiac Rhythm: A fib  Pt needs tele? No  Skin/wound Issues: None    Code Status: Full Code    Pain control: pt had none    Nausea control: pt had none    Abnormal labs/tests/findings requiring intervention: see chart    Family present during ED course? No   Family Comments/Social Situation comments: n/a    Tasks needing completion: None    Jp Yo, RN    [phone] [facility]

## 2020-04-11 NOTE — PROGRESS NOTES
Neurosurgery brief note    CTH 4/11/2020 0735  1. No significant change in right occipital/ posterior falx/right  tentorial subdural hemorrhage.  2. Unchanged subdural hematoma overlying the right cerebral convexity.  3. Large left parietal scalp hematoma is slightly increased in size.  4. Chronic small vessel ischemic disease.    Scan reviewed with Dr. Cid. Patient seen with Dr. Cid.   Okay to discharge from neurosurgery standpoint    Hold eliquis until follow up  Follow up in 2 weeks with repeat CTH, will discuss about restarting eliquis at that time.    Please contact neurosurgery resident on call with questions.    Dial * * [phone], enter [phone] when prompted.     Fabian Obrien MD  Neurosurgery PGY2

## 2020-04-11 NOTE — CONSULTS
[facility]       NEUROSURGERY CONSULTATION    This consultation was requested by Dr. Mesa from the ED service.    Reason for Consultation: traumatic SDH    HPI: Mr. Lemus is a 79 year-old male with a complex medical history, notable for atrial fibrillation on Eliquis who suffered a mechanical fall earlier today and was found at a tertiary hospital to have a subdural hematoma. He was then transferred to the [facility] for further management.     Mr. Lemus was raking his leaves earlier today. While raking his leaves he suffered a mechanical fall and hit the left-side of his head. He was alert and oriented but had left-sided rib pain. He had no headache; but was instructed that if he were to hit his head while on Eliquis he should present to the ED for medical attention. He was taken to [facility] where a CTH was performed and revealed a small 5 mm subdural hematoma. He had no further findings on CT Abdomen/Pelvis and CT spine, per report from outside hospital.     Mr. Lemus denies headache, weakness, LOC, numbness/weakness/paresthesias in extremities, changes in sensation, taste, smell, nor trouble speaking or other neurologic symptoms.    PAST MEDICAL HISTORY: Stroke with left-side hemiparesis many years ago. 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 Left-sided hearing loss. Cancer of left kidney (patient unaware of primary) s/p left kidney resection. Hypertension.     PAST SURGICAL HISTORY: Left Kidney resection.     SOCIAL HISTORY: Lives at home with wife. Has 3 adult children. Retired, formerly works at home. Spends his days currently volunteering with [employer] for [employer] once a week and woodworking often.     MEDICATIONS:  Current Outpatient Medications   Medication Sig Dispense Refill     apixaban ANTICOAGULANT (ELIQUIS) 5 MG tablet Take 5 mg by mouth 2 times daily       Ascorbic Acid (VITAMIN C) 500 MG CAPS         atorvastatin (LIPITOR) 20 MG tablet Take 20 mg by mouth daily       hydrochlorothiazide (HYDRODIURIL) 12.5 MG tablet Take 12.5 mg by mouth daily       timolol, PF, (TIMOPTIC OCUDOSE) 0.5 % ophthalmic solution 1 drop 2 times daily       verapamil ER (VERELAN) 180 MG 24 hr capsule Take 180 mg by mouth At Bedtime       brinzolamide-brimonidine (SIMBRINZA) 1-0.2 % ophthalmic suspension 1 drop 3 times daily       indomethacin (INDOCIN) 50 MG capsule Take 50 mg by mouth as needed for moderate pain (gout)     also takes states he takes lisinopril      Allergies:  No Known Allergies  -=  Physical exam:   Blood pressure (!) 148/77, pulse 86, temperature 98.8  F (37.1  C), temperature source Oral, resp. rate 11, SpO2 97 %.  NEUROLOGIC:  -- Awake; Alert; oriented x 3  -- Follows commands briskly  -- Speech fluent, spontaneous. No aphasia or dysarthria.  Cranial Nerves:  -- EOMI intact. Blind in left eye.  -- face symmetrical, tongue midline  -- left hearing absent.     Motor:  Normal bulk / tone; no tremor, rigidity, or bradykinesia.  No Pronator Drift   4+/5 in b/l biceps; 4+/5 in b/l triceps; 5/5 deltoids; 5/5 hand      Sensory: intact      Reflexes: right side 2+; left biceps 3+, left Martinez's present, left knee 3+     Gait: Deferred      IMAGING:  CT Head was reviewed. Demonstrates small liver of subdural blood.   Per report, CT C spine and CT AP negative.     LABS:  No labs available for review.    ASSESSMENT: Small acute subdural hematoma. Hematoma appears clinically silent and patient is asymptomatic.     RECOMMENDATIONS:  No neurosurgical intervention indicated at this time   Observe overnight; repeat head CT in 6-10 hours from the time of first acquisition  Please hold Eliquis now. Will discuss with staff in AM about when it would be OK to restart.   Normotension, normonatremia. PLT < 100,000.    Suleiman Pascal  Neurosurgery Resident     The patient was discussed with Dr. Cid, neurosurgery staff.  I have seen  this patient with the resident and formulated a plan and agree with this note.  AMP

## 2020-04-11 NOTE — PROGRESS NOTES
Discharged to: Home via Wife at 1330    Belongings: Wallet and clothing with patient    AVS (After Visit Summary) discussed with: Patient

## 2020-04-11 NOTE — PLAN OF CARE
PT 4A: Cancel and Defer- PT orders received. Observed patient up independently in the hallway, patient says he had fall while working out in the field- slipped on rocks. Denies any other recent falls and feels comfortable with discharge home, has plans to discharge home today. No acute PT needs identified at this time. Will complete orders.     Discharge Planner PT   Patient plan for discharge: Home  Current status: Up independently, at/near baseline level of function  Barriers to return to prior living situation: none from mobility standpoint  Recommendations for discharge: Home  Rationale for recommendations: At/near baseline level of function       Entered by: Roddy Shaikh 04/11/2020 11:53 AM

## 2020-04-11 NOTE — H&P
[facility], [place]    History and Physical / Consult note: Trauma Service     Date of Admission:  4/10/2020    Time of Admission/Consult Request (page/call): 4/10/2020    Time of my evaluation: 4/10/2020    Consulting services:  Neurosurgery - Emergent consult (within 30 mins): Called by ED    Assessment & Plan      Trauma mechanism: Fall from standing    Time/date of injury: 4/10/2020    Known Injuries:  1. Left SDH (small (5 mm) subdural hematoma)    Other diagnoses:  1. Left residual hemiparesis from previous stroke  2. Slight hyponatremia  3. Left eye blindness  4. Left eye melanoma  5. Renal cell carcinoma s/p left nephrectomy    Plan:  1. Neurosurgery Consult: Repeat CT head 6 hours from previous. Hold off Eliquis. Keep sodium >135  2. Admit to SICU: neurochecks    Plan discussed with Arie Doshi MD Trauma Attending    Dolores Kasper MD  Trauma Moonlighter  [id number]        Code status: Full code    General Cares:  GI Prophylaxis: n/a  DVT Prophylaxis: SCDs  Pulmonary toilet: IS    ETOH: This patient was asked if in the last 3-6 months there has been a time when he had  5 or more drinks in a single day/outing.. Patient answer to the screening question was in the negative. No intervention needed.  Primary Care Physician   No primary care provider on file.    Chief Complaint   Fall     History is obtained from the patient, electronic health record and emergency department physician    History of Present Illness   Renan Lemus is a 79 year old male a past medical history of atrial fibrillation on Eliquis presented to [facility] ER as a transfer from [facility] ER for trauma evaluation.  The patient had a fall while raking leaves and slipped and landed on his left side and did hit his head. Denies any loss of consciousness. He went inside the house and mentioned to his wife but he said he felt fine. He became concerned due to his anticoagulant use and decided to go to [facility] for  evaluation.  He was noted to be neurologically intact with GCS 15. Work-up evaluation included a head CT that revealed a small (5 mm) subdural hematoma, CT abdomen/pelvis and FAST exam were negative. He has remained hemodynamically stable before and during his transfer. No medications were given prior to his arrival here.  Labs were noted to be unremarkable but slightly hyponatremic with Na at 134.  During my evaluation, patient denies any symptoms at this time but soreness ob=shine his left scalp from a scalp hematoma. Denies any other symptoms.       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 Taking?   Ascorbic Acid (VITAMIN C) 500 MG CAPS 4/10/2020 at Unknown time  Yes Yes   apixaban ANTICOAGULANT (ELIQUIS) 5 MG tablet 4/10/2020 at Unknown time  Yes Yes   Sig: Take 5 mg by mouth 2 times daily   atorvastatin (LIPITOR) 20 MG tablet 4/10/2020 at Unknown time  Yes Yes   Sig: Take 20 mg by mouth daily   brinzolamide-brimonidine (SIMBRINZA) 1-0.2 % ophthalmic suspension Unknown at Unknown time  Yes No   Si drop 3 times daily   hydrochlorothiazide (HYDRODIURIL) 12.5 MG tablet 4/10/2020 at Unknown time  Yes Yes   Sig: Take 12.5 mg by mouth daily   indomethacin (INDOCIN) 50 MG capsule Unknown at Unknown time  Yes No   Sig: Take 50 mg by mouth as needed for moderate pain (gout)   timolol, PF, (TIMOPTIC OCUDOSE) 0.5 % ophthalmic solution 4/10/2020 at Unknown time  Yes Yes   Si drop 2 times daily   verapamil ER (VERELAN) 180 MG 24 hr capsule 2020 at Unknown time  Yes Yes   Sig: Take 180 mg by mouth At Bedtime       Facility-Administered Medications: None     Allergies   No Known Allergies    Social History   Social History     Socioeconomic History     Marital status: Not on file     Spouse name: Not on file     Number of children: Not on file     Years of education: Not on file     Highest education level: Not on file   Occupational History     Not on file   Social Needs     Financial resource strain: Not on file     Food insecurity     Worry: Not on file     Inability: Not on file     Transportation needs     Medical: Not on file     Non-medical: Not on file   Tobacco Use     Smoking status: Not on file   Substance and Sexual Activity     Alcohol use: Not on file     Drug use: Not on file     Sexual activity: Not on file   Lifestyle     Physical activity     Days per week: Not on file     Minutes per session: Not on file     Stress: Not on file   Relationships     Social connections     Talks on phone: Not on file     Gets together: Not on file     Attends Mandaeism service: Not on file     Active member of club or organization: Not on file     Attends meetings of clubs or organizations: Not on file     Relationship status: Not on file     Intimate partner violence     Fear of current or ex partner: Not on file     Emotionally abused: Not on file     Physically abused: Not on file     Forced sexual activity: Not on file   Other Topics Concern     Not on file   Social History Narrative     Not on file       Family History   I have reviewed this patient's family history and updated it with pertinent information if needed.   No family history on file.    Review of Systems   CONSTITUTIONAL: No fever, chills, sweats, fatigue   EYES: no visual blurring, no double vision or visual loss  ENT: no decrease in hearing, no tinnitus, no vertigo, no hoarseness  RESPIRATORY: no shortness of breath, no cough, no sputum   CARDIOVASCULAR: no palpitations, no chest  pain, no exertional chest pain or pressure  GASTROINTESTINAL: no nausea or  vomiting, or abd pain  GENITOURINARY: no dysuria, no frequency or hesitancy, no hematuria  MUSCULOSKELETAL: no weakness, no redness, no swelling, no joint pain,   SKIN: no rashes, ecchymoses, abrasions or lacerations  NEUROLOGIC: no numbness or tingling of hands, no numbness or tingling  of feet, no syncope, no tremors or weakness  PSYCHIATRIC: no sleep disturbances, no anxiety or depression    Physical Exam   Temp: 98.8  F (37.1  C) Temp src: Oral BP: (!) 140/81   Heart Rate: 103   SpO2: 98 % O2 Device: None (Room air)    Vital Signs with Ranges  Temp:  [98.8  F (37.1  C)] 98.8  F (37.1  C)  Heart Rate:  [103] 103  BP: (140)/(81) 140/81  SpO2:  [98 %] 98 % 0 lbs 0 oz    Primary Survey:  Airway: patient talking  Breathing: symmetric respiratory effort bilaterally  Circulation: central pulses present and peripheral pulses present  Disability: Pupils - left 2 mm and brisk, right 2 mm and brisk   Keri Coma Scale - Total 15/15  Eye Response (E): 4  4= spontaneous,  3= to verbal/voice, 2=  to pain, 1= No response   Verbal Response (V): 5   5= Orientated, converses,  4= Confused, converses, 3= Inappropriate words,  2= Incomprehensible sounds,  1=No response   Motor Response (M): 6   6= Obeys commands, 5= Localizes to pain, 4= Withdrawal to pain, 3=Fexion to pain, 2= Extension to pain, 1= No response    Secondary Survey:  General: alert, oriented to person, place, time  Neuro: PERRLA. EOMI. CN II-XII grossly intact. No focal deficits. Strength 4/5 (left side), 5/5 right side x 4 extremities.  Sensation intact.  Head: left scalp hematoma otherwise atraumatic, normocephalic, trachea midline  Eyes:  Pupils 2 mm, EOMI, corneas and conjunctivae clear  Ears: pearly grey bilateral TMs and non-inflamed external ear canals  Nose: nares patent, no drainage, nasal septum non-tender  Mouth/Throat: no exudates or erythema,  no dental tenderness or malocclusions, no tongue lacerations  Neck:  No cervical collar present. No midline  posterior tenderness, full AROM without pain.   Chest/Pulmonary: normal respiratory rate and rhythm,  bilateral clear breath sounds, no wheezes, rales or rhonchi, no chest wall tenderness or deformities,   Cardiovascular: S1, S2,  normal and regular rate and rhythm, no murmurs  Abdomen: soft, non-tender, no guarding, no rebound tenderness and no tenderness to palpation  : normal external genitalia, pelvis stable to lateral compression, no isbell, no urine assess/urine yellow and clear  Musculoskel/Extremities: normal extremities, full AROM of major joints without tenderness, edema, erythema, ecchymosis, or abrasions. PP. No edema.   Back/Spine: no deformity, no midline tenderness, no sacral tenderness, no step-offs and no abrasions or contusions  Hands: no gross deformities of hands or fingers. Full AROM of hand and fingers in flexion and extension.  strength equal and symmetric.   Psychiatric: affect/mood normal, cooperative, normal judgement/insight and memory intact  Skin: no rashes, laceration, ecchymosis, skin warm and dry.     No results found for this or any previous visit (from the past 24 hour(s)).           1.  No fractures in the field-of-view. There is a small contusion in the left buttock. No large hematoma identified.  2.   Mild lymphadenopathy in the abdomen and pelvis of indeterminate etiology. Malignancy is not excluded.  3.  There are left adrenal nodules. Compare with any prior imaging.   4.  Left nephrectomy. A 13 mm right renal lesion does not meet criteria for a simple cyst.   5.  Recommend abdominal MRI or CT for evaluation of the right renal and left adrenal nodules if prior imaging is not available.   Result Narrative   EXAM: CT ABDOMEN PELVIS WO ORAL W IV CONTRAST  LOCATION: [facility]  DATE/TIME: 4/10/2020 5:00 PM    INDICATION: Fall on left side. On blood thinners. Tenderness of the left lateral abdomen and left lateral ribs.   COMPARISON: None.  TECHNIQUE: CT scan of the  abdomen and pelvis was performed following injection of IV contrast. Multiplanar reformats were obtained. Dose reduction techniques were used.  CONTRAST: 75 mL of Omnipaque 350     FINDINGS:   LOWER CHEST: Coronary artery calcification. Small hiatal hernia.     HEPATOBILIARY: Cholelithiasis.     PANCREAS: Normal.    SPLEEN: Normal size. No parenchymal abnormality identified.    ADRENAL GLANDS: There is a 2.3 cm left adrenal nodule.  There is a separate bilobed 3 cm left adrenal nodule.    KIDNEYS/BLADDER: Left nephrectomy. There is a 13 mm right renal lesion which is intermediate in attenuation (series 4 image 132). There is a 12 mm simple cyst in the anterior right kidney (image 141). There is a simple cyst at the lower pole of the right   kidney (image 193). A few smaller lesions are too small to characterize. No hydronephrosis or hydroureter. Normal urinary bladder.     BOWEL: Normal.    LYMPH NODES: There is an 11 mm gastrohepatic lymph node. There is a 12 mm right external iliac lymph node.  There are multiple prominent nonenlarged abdominal and pelvic lymph nodes.    VASCULATURE: Atherosclerotic disease.     PELVIC ORGANS: Mildly enlarged prostate gland.     MUSCULOSKELETAL: There are no fractures in the field-of-view. There is mild subcutaneous fat stranding in the left buttock.    Other Result Information   Interface, Rad Results In - 04/10/2020  5:45 PM CDT  EXAM: CT ABDOMEN PELVIS WO ORAL W IV CONTRAST  LOCATION: [facility]  DATE/TIME: 4/10/2020 5:00 PM    INDICATION: Fall on left side. On blood thinners. Tenderness of the left lateral abdomen and left lateral ribs.   COMPARISON: None.  TECHNIQUE: CT scan of the abdomen and pelvis was performed following injection of IV contrast. Multiplanar reformats were obtained. Dose reduction techniques were used.  CONTRAST: 75 mL of Omnipaque 350     FINDINGS:   LOWER CHEST: Coronary artery calcification. Small hiatal hernia.     HEPATOBILIARY:  Cholelithiasis.     PANCREAS: Normal.    SPLEEN: Normal size. No parenchymal abnormality identified.    ADRENAL GLANDS: There is a 2.3 cm left adrenal nodule.  There is a separate bilobed 3 cm left adrenal nodule.    KIDNEYS/BLADDER: Left nephrectomy. There is a 13 mm right renal lesion which is intermediate in attenuation (series 4 image 132). There is a 12 mm simple cyst in the anterior right kidney (image 141). There is a simple cyst at the lower pole of the right   kidney (image 193). A few smaller lesions are too small to characterize. No hydronephrosis or hydroureter. Normal urinary bladder.     BOWEL: Normal.    LYMPH NODES: There is an 11 mm gastrohepatic lymph node. There is a 12 mm right external iliac lymph node.  There are multiple prominent nonenlarged abdominal and pelvic lymph nodes.    VASCULATURE: Atherosclerotic disease.     PELVIC ORGANS: Mildly enlarged prostate gland.     MUSCULOSKELETAL: There are no fractures in the field-of-view. There is mild subcutaneous fat stranding in the left buttock.     IMPRESSION:   1.  No fractures in the field-of-view. There is a small contusion in the left buttock. No large hematoma identified.  2.   Mild lymphadenopathy in the abdomen and pelvis of indeterminate etiology. Malignancy is not excluded.  3.  There are left adrenal nodules. Compare with any prior imaging.   4.  Left nephrectomy. A 13 mm right renal lesion does not meet criteria for a simple cyst.   5.  Recommend abdominal MRI or CT for evaluation of the right renal and left adrenal nodules if prior imaging is not available.       Studies:  No orders to display       Dolores Kasper

## 2020-04-11 NOTE — PHARMACY-ADMISSION MEDICATION HISTORY
Admission medication history interview status for the 4/10/2020 admission is complete. See Epic admission navigator for allergy information, pharmacy, prior to admission medications and immunization status.     Medication history interview sources:  Patient    Changes made to PTA medication list (reason)  Added: MVI, Calcium  Deleted: Brinzolamide/brimonidine eye drops  Changed:    Verapamil 180 mg > 360 mg (patient states he takes two capsules)    Additional medication history information (including reliability of information, actions taken by pharmacist):   1. Patient was a reliable historian. Knew medication names, doses, and frequencies.   2. Patient confirmed allergies and reactions. They have no additional allergies to report.       Prior to Admission medications    Medication Sig Last Dose Taking? Auth Provider   apixaban ANTICOAGULANT (ELIQUIS) 5 MG tablet Take 5 mg by mouth 2 times daily 4/10/2020 at PM Yes Reported, Patient   Ascorbic Acid (VITAMIN C) 500 MG CAPS Take 1 tablet by mouth daily  4/9/2020 at PM Yes Reported, Patient   atorvastatin (LIPITOR) 20 MG tablet Take 20 mg by mouth daily 4/9/2020 at PM Yes Reported, Patient   CALCIUM PO Take 1 tablet by mouth daily 4/9/2020 at PM Yes Unknown, Entered By History   hydrochlorothiazide (HYDRODIURIL) 12.5 MG tablet Take 12.5 mg by mouth daily 4/10/2020 at PM Yes Reported, Patient   indomethacin (INDOCIN) 50 MG capsule Take 50 mg by mouth as needed for moderate pain (gout)  Yes Reported, Patient   multivitamin w/minerals (MULTI-VITAMIN) tablet Take 1 tablet by mouth daily 4/9/2020 at PM Yes Unknown, Entered By History   timolol, PF, (TIMOPTIC OCUDOSE) 0.5 % ophthalmic solution Apply 1 drop to eye daily  4/10/2020 at PM Yes Reported, Patient   verapamil ER (VERELAN) 180 MG 24 hr capsule Take 360 mg by mouth daily  4/10/2020 at AM Yes Reported, Patient       Medication history completed by: Ana Davis, RosarioD

## 2020-04-12 ENCOUNTER — PATIENT OUTREACH (OUTPATIENT)
Dept: CARE COORDINATION | Facility: CLINIC | Age: 80
End: 2020-04-12

## 2020-04-12 NOTE — PROGRESS NOTES
SURGICAL ICU PROGRESS NOTE  April 12, 2020      CO-MORBIDITIES:   SDH (subdural hematoma) (H)  (primary encounter diagnosis)  Subdural hematoma (H)  Fall, initial encounter  Activities involving raking leaves  Rib contusion, left, initial encounter    Changes today  -PTA meds restarted  -verapamil restarted at lower dose  -transfer to floor or home per trauma team    ASSESSMENT: Renan Lemus is a 79 year old male with a 5mm SDH post ground level fall    PLAN:  Neuro/ pain/ sedation:  #SDH  #proliferative radiation retinopathy, left eye  #Hx left eye melanoma  - Monitor neurological status; notify the MD for any acute changes in exam  - Q1H neuro checks  - Repeat head CT stable  - Neurosurgery following, ok with discharge  - Normonatremia  -acute pain management with prn acetaminophen  -PTA eyedrops: timolol      Pulmonary care:   #Rib contusion  - Supplemental oxygen to keep saturation above 92 %.  - Incentive spirometer every 15- 30 minutes when awake.    Cardiovascular:    #HTN  #HLD  #chronic afib on Eliquis  -PTA Lipitor, PTA hydrochlorothiazide started, home verapamil started at lowered dose of 40mg every 6 hours  -Hold PTA Eliquis until neurosurgery follow up  - Monitor hemodynamic status.       GI care:   #no acute issues  -regular diet    Fluids/ Electrolytes  #no acute issues  - NS for IV fluid hydration, discontinued with start of diet      Renal/ Fluid Balance:    #no acute issues  - Urine output is adequate      Endocrine:    #no acute issues  - No management indication.     ID/ Antibiotics:  - No indication for antibiotics.     Heme:     -no acute issues  - Hemoglobin stable.       Prophylaxis:    - Mechanical prophylaxis for DVT      Lines/ tubes/ drains:  - PIV    Disposition:  - transfer to floor vs home per trauma    Patient seen, findings and plan discussed with surgical ICU staff, Dr. Patiño.  Critical care time: 30 minutes      ====================================    SUBJECTIVE:   Patient  seen in bed, in no acute distress. No complaints of pain, states he is hungry. No shortness of breath.     OBJECTIVE:   1. VITAL SIGNS:   Pulse:  [77-79] 79  Heart Rate:  [74-76] 74  BP: (132-148)/(64-81) 132/64  SpO2:  [94 %-96 %] 94 %  Resp: 16      2. INTAKE/ OUTPUT:   I/O last 3 completed shifts:  In: 655 [P.O.:190; I.V.:465]  Out: 250 [Urine:250]    3. PHYSICAL EXAMINATION:     GEN: NAD, awake and conversational, seated in chair  EYES: PERRL, Anicteric sclera.   HEENT:  Normocephalic, atraumatic, trachea midline, ETT secure  CV: RRR, no gallops, rubs, or murmurs  PULM/CHEST: Clear breath sounds bilaterally without rhonchi, crackles or wheeze, symmetric chest rise  GI: normal bowel sounds, soft, non-tender, no rebound tenderness or guarding  EXTREMITIES: no peripheral edema, moving all extremities, peripheral pulses intact  NEURO: Cranial nerves II-XII grossly intact, no motor-sensory deficits noted  SKIN: No rashes, sores or ulcerations  PSYCH:  Affect: appropriate, anxious, mentation at baseline    Basic Metabolic Panel  Recent Labs   Lab 04/11/20  0301      POTASSIUM 3.4   CHLORIDE 102   CO2 26   BUN 18   CR 0.96   *     Liver Function Tests  No lab results found in last 7 days.  Pancreatic Enzymes  No lab results found in last 7 days.  Coagulation Profile  No lab results found in last 7 days.      5. RADIOLOGY:   No results found for this or any previous visit (from the past 24 hour(s)).    =========================================

## 2020-04-13 ENCOUNTER — TELEPHONE (OUTPATIENT)
Dept: NEUROSURGERY | Facility: CLINIC | Age: 80
End: 2020-04-13

## 2020-04-13 DIAGNOSIS — S06.5XAA SDH (SUBDURAL HEMATOMA) (H): Primary | ICD-10-CM

## 2020-04-13 NOTE — TELEPHONE ENCOUNTER
[facility] Call Center    Phone Message    May a detailed message be left on voicemail: yes     Reason for Call: Other: Renan calling to make an appointment per the discharge papers he received from the ED on 4/11/20. It says in his discharge papers that Renan needs to follow up in two weeks with the neurosurgery clinic with a repeat head CT. Orders would need to be placed for Renan to have the CT as well. Please give Hernandez call back at your earliest convenience.     Action Taken: Other:  Neuro Surg    Travel Screening: Not Applicable

## 2020-04-14 NOTE — PROGRESS NOTES
Patient was called three times and no answer so post 24 hr DC follow up calls will be closed out    Cheryl Pickard CMA   Post Hospital Discharge Team

## 2020-04-27 ENCOUNTER — ANCILLARY PROCEDURE (OUTPATIENT)
Dept: CT IMAGING | Facility: CLINIC | Age: 80
End: 2020-04-27
Attending: NURSE PRACTITIONER
Payer: MEDICARE

## 2020-04-27 ENCOUNTER — VIRTUAL VISIT (OUTPATIENT)
Dept: NEUROSURGERY | Facility: CLINIC | Age: 80
End: 2020-04-27
Payer: MEDICARE

## 2020-04-27 DIAGNOSIS — S06.5XAA SDH (SUBDURAL HEMATOMA) (H): Primary | ICD-10-CM

## 2020-04-27 DIAGNOSIS — S06.5XAA SDH (SUBDURAL HEMATOMA) (H): ICD-10-CM
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
## 2020-04-27 RX ORDER — LISINOPRIL 20 MG/1
20 TABLET ORAL DAILY
COMMUNITY

## 2020-04-27 NOTE — PROGRESS NOTES
"Renan Lemus is a 79 year old male who is being evaluated via a billable telephone visit.  This is a telephone visit conducted due to [facility] restrictions on non-urgent clinic visits due to risk of the spread of COVID-19 pandemic virus. The patient did not identify any urgent or red-flag symptoms that would require in-person evaluation.     The patient has been notified of following:     \"This telephone visit will be conducted via a call between you and your physician/provider. We have found that certain health care needs can be provided without the need for a physical exam.  This service lets us provide the care you need with a short phone conversation.  If a prescription is necessary we can send it directly to your pharmacy.  If lab work is needed we can place an order for that and you can then stop by our lab to have the test done at a later time.    Telephone visits are billed at different rates depending on your insurance coverage. During this emergency period, for some insurers they may be billed the same as an in-person visit.  Please reach out to your insurance provider with any questions.    If during the course of the call the physician/provider feels a telephone visit is not appropriate, you will not be charged for this service.\"    Patient has given verbal consent for Telephone visit?  Yes    How would you like to obtain your AVS? Mail a copy      Reason for visit:   S/p Fall with traumatic SDH     HPI: Mr. Lemus is a 79 year-old male with a complex medical history, notable for atrial fibrillation on Eliquis who suffered a mechanical fall on 4/10/20.      Per history:  While raking his leaves he suffered a mechanical fall and hit the left-side of his head. He was alert and oriented but had left-sided rib pain. He had no headache; but was instructed that if he were to hit his head while on Eliquis he should present to the ED for medical attention. He was taken " to [facility] where a CTH was performed and revealed a small 5 mm subdural hematoma. He had no further findings on CT Abdomen/Pelvis and CT spine, per report from outside hospital.   Mr. Lemus denies headache, weakness, LOC, numbness/weakness/paresthesias in extremities, changes in sensation, taste, smell, nor trouble speaking or other neurologic symptoms.  He was then transferred to the [facility] for further management.     Evaluated by Neurosurgery and was managed non-operatively.  He had repeat imaging of SDH head injury which showed stability. He was monitored with serial neurological examinations which remained stable. Neurosurgery recommended follow up in Neurosurgery clinic in 2 weeks with repeat head CT and to hold Eliquis for two weeks.   He was evaluated by physical therapies during his hospitalization, and deemed appropriate for discharge to home.     Today, he is doing very well.   Headache:  No headache complaints   No dizziness  No speech or vision difficulties   Balance-  No worse than baseline (hx of CVA)   He has some dried, crusted blood on back of scalp (no sutured lac per ED exam/reports)       PAST MEDICAL HISTORY:   Stroke with left-side hemiparesis many years ago.   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   Left-sided hearing loss.   Cancer of left kidney (patient unaware of primary) s/p left kidney resection.   Hypertension.       IMAGING:        Study Result     HEAD CT NO CONTRAST       4/27/2020 10:14 AM     Comparison: CT head from 4/11/2020.     Findings:  Evolution of subdural hemorrhage along the right occipital  lobe with complete decrease in density, without evidence of new acute  hemorrhage. Previously noted right cerebral convexity subdural  hemorrhage is not conspicuous on this exam.Left parietal scalp  hematoma is decreased in size from previous.     No new intracranial hemorrhage, mass effect, or midline shift. The  ventricles are  "proportionate to the cerebral sulci. The gray to white  matter differentiation of the cerebral hemispheres is preserved. The  basal cisterns are patent. Mild leukoaraiosis and mild generalized  parenchyma volume loss.     The visualized paranasal sinuses are clear. The mastoid air cells are  clear.                                                                      Impression:  1. Evolution of right occipital subdural hemorrhage with decreased  thickness and density.  2. Previously noted subdural hematoma over the right cerebral  convexity is near completely resolved  3. Increased in size of left parietal scalp hematoma.  4. Chronic small vessel ischemic disease.     I have personally reviewed the examination and initial interpretation  and I agree with the findings.     Addendum:     In the impression, left parietal scalp hematoma should read as  \"decreased\", not increased.     ONIEL GUZMAN MD              ASSESSMENT:         Mechanical fall with Subdural Hemorrhage        PLAN:   ~Scabbing should dry and all off on its own  ~Ok to resume Eliquis   ~Reviewed signs and symptoms of new or increased intracranial bleeding and patient advised to   Come to the emergency department if he develops new pain or symptoms.   ~No Aspirin or alcohol   ~Okay to gradually increase activity and lifting as tolerated  ~Return to the Neurosurgery clinic on an as-needed basis or if new symptoms or concerns    At the end of the visit, all the patient's questions and concerns had been addressed and the patient was agreeable with the plan of care as outlined above. The patient has our office contact information and knows to call with any questions, concerns, or changes in his condition.      Luly Gomez DNP  Neurosurgery Nurse Practitioner  [facility]  [phone]    Phone call duration: 11 minutes    .        "

## 2020-08-20 ENCOUNTER — RECORDS - HEALTHEAST (OUTPATIENT)
Dept: LAB | Facility: CLINIC | Age: 80
End: 2020-08-20

## 2020-08-20 LAB
ANION GAP SERPL CALCULATED.3IONS-SCNC: 11 MMOL/L (ref 5–18)
BUN SERPL-MCNC: 18 MG/DL (ref 8–28)
CALCIUM SERPL-MCNC: 9.6 MG/DL (ref 8.5–10.5)
CHLORIDE BLD-SCNC: 101 MMOL/L (ref 98–107)
CHOLEST SERPL-MCNC: 126 MG/DL
CO2 SERPL-SCNC: 24 MMOL/L (ref 22–31)
CREAT SERPL-MCNC: 1.06 MG/DL (ref 0.7–1.3)
FASTING STATUS PATIENT QL REPORTED: YES
GFR SERPL CREATININE-BSD FRML MDRD: >60 ML/MIN/1.73M2
GLUCOSE BLD-MCNC: 94 MG/DL (ref 70–125)
HDLC SERPL-MCNC: 39 MG/DL
LDLC SERPL CALC-MCNC: 70 MG/DL
MAGNESIUM SERPL-MCNC: 2.1 MG/DL (ref 1.8–2.6)
POTASSIUM BLD-SCNC: 4.3 MMOL/L (ref 3.5–5)
PSA SERPL-MCNC: 0.8 NG/ML (ref 0–6.5)
SODIUM SERPL-SCNC: 136 MMOL/L (ref 136–145)
TRIGL SERPL-MCNC: 86 MG/DL

## 2021-01-04 ENCOUNTER — HEALTH MAINTENANCE LETTER (OUTPATIENT)
Age: 81
End: 2021-01-04

## 2021-02-15 ENCOUNTER — RECORDS - HEALTHEAST (OUTPATIENT)
Dept: LAB | Facility: CLINIC | Age: 81
End: 2021-02-15

## 2021-02-15 LAB
ANION GAP SERPL CALCULATED.3IONS-SCNC: 7 MMOL/L (ref 5–18)
BUN SERPL-MCNC: 25 MG/DL (ref 8–28)
CALCIUM SERPL-MCNC: 9.4 MG/DL (ref 8.5–10.5)
CHLORIDE BLD-SCNC: 102 MMOL/L (ref 98–107)
CHOLEST SERPL-MCNC: 137 MG/DL
CO2 SERPL-SCNC: 27 MMOL/L (ref 22–31)
CREAT SERPL-MCNC: 1.1 MG/DL (ref 0.7–1.3)
FASTING STATUS PATIENT QL REPORTED: ABNORMAL
GFR SERPL CREATININE-BSD FRML MDRD: >60 ML/MIN/1.73M2
GLUCOSE BLD-MCNC: 92 MG/DL (ref 70–125)
HDLC SERPL-MCNC: 38 MG/DL
LDLC SERPL CALC-MCNC: 77 MG/DL
POTASSIUM BLD-SCNC: 4.6 MMOL/L (ref 3.5–5)
SODIUM SERPL-SCNC: 136 MMOL/L (ref 136–145)
TRIGL SERPL-MCNC: 109 MG/DL

## 2021-04-28 ENCOUNTER — RECORDS - HEALTHEAST (OUTPATIENT)
Dept: LAB | Facility: CLINIC | Age: 81
End: 2021-04-28

## 2021-04-28 LAB
ANION GAP SERPL CALCULATED.3IONS-SCNC: 9 MMOL/L (ref 5–18)
BUN SERPL-MCNC: 20 MG/DL (ref 8–28)
CALCIUM SERPL-MCNC: 9.3 MG/DL (ref 8.5–10.5)
CHLORIDE BLD-SCNC: 99 MMOL/L (ref 98–107)
CHOLEST SERPL-MCNC: 122 MG/DL
CO2 SERPL-SCNC: 28 MMOL/L (ref 22–31)
CREAT SERPL-MCNC: 1.11 MG/DL (ref 0.7–1.3)
FASTING STATUS PATIENT QL REPORTED: ABNORMAL
GFR SERPL CREATININE-BSD FRML MDRD: >60 ML/MIN/1.73M2
GLUCOSE BLD-MCNC: 98 MG/DL (ref 70–125)
HDLC SERPL-MCNC: 36 MG/DL
LDLC SERPL CALC-MCNC: 66 MG/DL
POTASSIUM BLD-SCNC: 4 MMOL/L (ref 3.5–5)
SODIUM SERPL-SCNC: 136 MMOL/L (ref 136–145)
TRIGL SERPL-MCNC: 101 MG/DL

## 2021-05-31 ENCOUNTER — RECORDS - HEALTHEAST (OUTPATIENT)
Dept: ADMINISTRATIVE | Facility: CLINIC | Age: 81
End: 2021-05-31

## 2021-07-06 ENCOUNTER — RECORDS - HEALTHEAST (OUTPATIENT)
Dept: LAB | Facility: CLINIC | Age: 81
End: 2021-07-06

## 2021-07-06 LAB
ALBUMIN SERPL-MCNC: 4.1 G/DL (ref 3.5–5)
ALP SERPL-CCNC: 64 U/L (ref 45–120)
ALT SERPL W P-5'-P-CCNC: 25 U/L (ref 0–45)
ANION GAP SERPL CALCULATED.3IONS-SCNC: 10 MMOL/L (ref 5–18)
AST SERPL W P-5'-P-CCNC: 23 U/L (ref 0–40)
BILIRUB SERPL-MCNC: 1 MG/DL (ref 0–1)
BUN SERPL-MCNC: 21 MG/DL (ref 8–28)
CALCIUM SERPL-MCNC: 10.1 MG/DL (ref 8.5–10.5)
CHLORIDE BLD-SCNC: 101 MMOL/L (ref 98–107)
CO2 SERPL-SCNC: 26 MMOL/L (ref 22–31)
CREAT SERPL-MCNC: 1.01 MG/DL (ref 0.7–1.3)
GFR SERPL CREATININE-BSD FRML MDRD: >60 ML/MIN/1.73M2
GLUCOSE BLD-MCNC: 108 MG/DL (ref 70–125)
POTASSIUM BLD-SCNC: 4.2 MMOL/L (ref 3.5–5)
PROT SERPL-MCNC: 7 G/DL (ref 6–8)
SODIUM SERPL-SCNC: 137 MMOL/L (ref 136–145)
TSH SERPL DL<=0.005 MIU/L-ACNC: 1.75 UIU/ML (ref 0.3–5)
VIT B12 SERPL-MCNC: 671 PG/ML (ref 213–816)

## 2021-07-16 ENCOUNTER — LAB REQUISITION (OUTPATIENT)
Dept: LAB | Facility: CLINIC | Age: 81
End: 2021-07-16
Payer: MEDICARE

## 2021-07-16 DIAGNOSIS — M79.10 MYALGIA, UNSPECIFIED SITE: ICD-10-CM

## 2021-07-16 LAB
CK SERPL-CCNC: 137 U/L (ref 30–190)
ERYTHROCYTE [SEDIMENTATION RATE] IN BLOOD BY WESTERGREN METHOD: 18 MM/HR (ref 0–15)

## 2021-07-16 PROCEDURE — 85652 RBC SED RATE AUTOMATED: CPT | Mod: ORL | Performed by: FAMILY MEDICINE

## 2021-07-16 PROCEDURE — 82550 ASSAY OF CK (CPK): CPT | Performed by: FAMILY MEDICINE

## 2021-07-16 PROCEDURE — 36415 COLL VENOUS BLD VENIPUNCTURE: CPT | Mod: ORL | Performed by: FAMILY MEDICINE

## 2021-07-16 PROCEDURE — 86141 C-REACTIVE PROTEIN HS: CPT | Mod: ORL | Performed by: FAMILY MEDICINE

## 2021-07-17 LAB — C REACTIVE PROTEIN LHE: 0.3 MG/DL (ref 0–0.8)

## 2021-10-10 ENCOUNTER — HEALTH MAINTENANCE LETTER (OUTPATIENT)
Age: 81
End: 2021-10-10

## 2021-10-11 ENCOUNTER — LAB REQUISITION (OUTPATIENT)
Dept: LAB | Facility: CLINIC | Age: 81
End: 2021-10-11
Payer: MEDICARE

## 2021-10-11 DIAGNOSIS — M79.10 MYALGIA, UNSPECIFIED SITE: ICD-10-CM

## 2021-10-11 DIAGNOSIS — E78.5 HYPERLIPIDEMIA, UNSPECIFIED: ICD-10-CM

## 2021-10-11 LAB
CHOLEST SERPL-MCNC: 118 MG/DL
ERYTHROCYTE [SEDIMENTATION RATE] IN BLOOD BY WESTERGREN METHOD: 15 MM/HR (ref 0–15)
FASTING STATUS PATIENT QL REPORTED: ABNORMAL
HDLC SERPL-MCNC: 39 MG/DL
LDLC SERPL CALC-MCNC: 62 MG/DL
TRIGL SERPL-MCNC: 83 MG/DL

## 2021-10-11 PROCEDURE — 80061 LIPID PANEL: CPT | Mod: ORL | Performed by: FAMILY MEDICINE

## 2021-10-11 PROCEDURE — 85652 RBC SED RATE AUTOMATED: CPT | Mod: ORL | Performed by: FAMILY MEDICINE

## 2022-01-18 ENCOUNTER — LAB REQUISITION (OUTPATIENT)
Dept: LAB | Facility: CLINIC | Age: 82
End: 2022-01-18
Payer: MEDICARE

## 2022-01-18 DIAGNOSIS — I12.9 HYPERTENSIVE CHRONIC KIDNEY DISEASE WITH STAGE 1 THROUGH STAGE 4 CHRONIC KIDNEY DISEASE, OR UNSPECIFIED CHRONIC KIDNEY DISEASE: ICD-10-CM

## 2022-01-18 LAB
ANION GAP SERPL CALCULATED.3IONS-SCNC: 10 MMOL/L (ref 5–18)
BUN SERPL-MCNC: 21 MG/DL (ref 8–28)
CALCIUM SERPL-MCNC: 9.7 MG/DL (ref 8.5–10.5)
CHLORIDE BLD-SCNC: 101 MMOL/L (ref 98–107)
CO2 SERPL-SCNC: 25 MMOL/L (ref 22–31)
CREAT SERPL-MCNC: 0.98 MG/DL (ref 0.7–1.3)
GFR SERPL CREATININE-BSD FRML MDRD: 77 ML/MIN/1.73M2
GLUCOSE BLD-MCNC: 91 MG/DL (ref 70–125)
POTASSIUM BLD-SCNC: 4.1 MMOL/L (ref 3.5–5)
SODIUM SERPL-SCNC: 136 MMOL/L (ref 136–145)

## 2022-01-18 PROCEDURE — 80048 BASIC METABOLIC PNL TOTAL CA: CPT | Mod: ORL | Performed by: FAMILY MEDICINE

## 2022-01-30 ENCOUNTER — HEALTH MAINTENANCE LETTER (OUTPATIENT)
Age: 82
End: 2022-01-30

## 2022-04-13 ENCOUNTER — HOSPITAL ENCOUNTER (OUTPATIENT)
Dept: CARDIOLOGY | Facility: HOSPITAL | Age: 82
Discharge: HOME | End: 2022-04-13
Attending: FAMILY MEDICINE | Admitting: FAMILY MEDICINE
Payer: MEDICARE

## 2022-04-13 DIAGNOSIS — I48.0 PAROXYSMAL ATRIAL FIBRILLATION (H): ICD-10-CM

## 2022-04-13 PROCEDURE — 93225 XTRNL ECG REC<48 HRS REC: CPT

## 2022-04-15 PROCEDURE — 93227 XTRNL ECG REC<48 HR R&I: CPT | Performed by: INTERNAL MEDICINE

## 2022-04-18 ENCOUNTER — LAB REQUISITION (OUTPATIENT)
Dept: LAB | Facility: CLINIC | Age: 82
End: 2022-04-18
Payer: MEDICARE

## 2022-04-18 DIAGNOSIS — E78.5 HYPERLIPIDEMIA, UNSPECIFIED: ICD-10-CM

## 2022-04-18 LAB
CHOLEST SERPL-MCNC: 130 MG/DL
HDLC SERPL-MCNC: 41 MG/DL
LDLC SERPL CALC-MCNC: 72 MG/DL
TRIGL SERPL-MCNC: 83 MG/DL

## 2022-04-18 PROCEDURE — 80061 LIPID PANEL: CPT | Mod: ORL | Performed by: FAMILY MEDICINE

## 2022-09-24 ENCOUNTER — HEALTH MAINTENANCE LETTER (OUTPATIENT)
Age: 82
End: 2022-09-24

## 2022-11-16 ENCOUNTER — LAB REQUISITION (OUTPATIENT)
Dept: LAB | Facility: CLINIC | Age: 82
End: 2022-11-16
Payer: MEDICARE

## 2022-11-16 DIAGNOSIS — Z12.5 ENCOUNTER FOR SCREENING FOR MALIGNANT NEOPLASM OF PROSTATE: ICD-10-CM

## 2022-11-16 DIAGNOSIS — I12.9 HYPERTENSIVE CHRONIC KIDNEY DISEASE WITH STAGE 1 THROUGH STAGE 4 CHRONIC KIDNEY DISEASE, OR UNSPECIFIED CHRONIC KIDNEY DISEASE: ICD-10-CM

## 2022-11-16 DIAGNOSIS — E78.5 HYPERLIPIDEMIA, UNSPECIFIED: ICD-10-CM

## 2022-11-16 LAB
ANION GAP SERPL CALCULATED.3IONS-SCNC: 11 MMOL/L (ref 7–15)
BUN SERPL-MCNC: 26.7 MG/DL (ref 8–23)
CALCIUM SERPL-MCNC: 9.8 MG/DL (ref 8.8–10.2)
CHLORIDE SERPL-SCNC: 101 MMOL/L (ref 98–107)
CHOLEST SERPL-MCNC: 112 MG/DL
CREAT SERPL-MCNC: 1.05 MG/DL (ref 0.67–1.17)
DEPRECATED HCO3 PLAS-SCNC: 24 MMOL/L (ref 22–29)
GFR SERPL CREATININE-BSD FRML MDRD: 71 ML/MIN/1.73M2
GLUCOSE SERPL-MCNC: 85 MG/DL (ref 70–99)
HDLC SERPL-MCNC: 38 MG/DL
LDLC SERPL CALC-MCNC: 57 MG/DL
MAGNESIUM SERPL-MCNC: 1.9 MG/DL (ref 1.7–2.3)
NONHDLC SERPL-MCNC: 74 MG/DL
POTASSIUM SERPL-SCNC: 3.8 MMOL/L (ref 3.4–5.3)
PSA SERPL-MCNC: 1.14 NG/ML
SODIUM SERPL-SCNC: 136 MMOL/L (ref 136–145)
TRIGL SERPL-MCNC: 83 MG/DL

## 2022-11-16 PROCEDURE — 80061 LIPID PANEL: CPT | Mod: ORL | Performed by: FAMILY MEDICINE

## 2022-11-16 PROCEDURE — G0103 PSA SCREENING: HCPCS | Mod: ORL | Performed by: FAMILY MEDICINE

## 2022-11-16 PROCEDURE — 80048 BASIC METABOLIC PNL TOTAL CA: CPT | Mod: ORL | Performed by: FAMILY MEDICINE

## 2022-11-16 PROCEDURE — 83735 ASSAY OF MAGNESIUM: CPT | Mod: ORL | Performed by: FAMILY MEDICINE

## 2023-05-19 ENCOUNTER — LAB REQUISITION (OUTPATIENT)
Dept: LAB | Facility: CLINIC | Age: 83
End: 2023-05-19
Payer: MEDICARE

## 2023-05-19 DIAGNOSIS — E78.5 HYPERLIPIDEMIA, UNSPECIFIED: ICD-10-CM

## 2023-05-19 DIAGNOSIS — I10 ESSENTIAL (PRIMARY) HYPERTENSION: ICD-10-CM

## 2023-05-19 LAB
ANION GAP SERPL CALCULATED.3IONS-SCNC: 12 MMOL/L (ref 7–15)
BUN SERPL-MCNC: 20.5 MG/DL (ref 8–23)
CALCIUM SERPL-MCNC: 9.9 MG/DL (ref 8.8–10.2)
CHLORIDE SERPL-SCNC: 99 MMOL/L (ref 98–107)
CHOLEST SERPL-MCNC: 119 MG/DL
CREAT SERPL-MCNC: 0.95 MG/DL (ref 0.67–1.17)
DEPRECATED HCO3 PLAS-SCNC: 26 MMOL/L (ref 22–29)
GFR SERPL CREATININE-BSD FRML MDRD: 80 ML/MIN/1.73M2
GLUCOSE SERPL-MCNC: 94 MG/DL (ref 70–99)
HDLC SERPL-MCNC: 41 MG/DL
LDLC SERPL CALC-MCNC: 62 MG/DL
NONHDLC SERPL-MCNC: 78 MG/DL
POTASSIUM SERPL-SCNC: 3.7 MMOL/L (ref 3.4–5.3)
SODIUM SERPL-SCNC: 137 MMOL/L (ref 136–145)
TRIGL SERPL-MCNC: 80 MG/DL

## 2023-05-19 PROCEDURE — 80048 BASIC METABOLIC PNL TOTAL CA: CPT | Mod: ORL | Performed by: FAMILY MEDICINE

## 2023-05-19 PROCEDURE — 80061 LIPID PANEL: CPT | Mod: ORL | Performed by: FAMILY MEDICINE

## 2023-12-17 ENCOUNTER — HEALTH MAINTENANCE LETTER (OUTPATIENT)
Age: 83
End: 2023-12-17

## 2024-01-11 ENCOUNTER — LAB REQUISITION (OUTPATIENT)
Dept: LAB | Facility: CLINIC | Age: 84
End: 2024-01-11
Payer: MEDICARE

## 2024-01-11 DIAGNOSIS — I10 ESSENTIAL (PRIMARY) HYPERTENSION: ICD-10-CM

## 2024-01-11 DIAGNOSIS — E78.5 HYPERLIPIDEMIA, UNSPECIFIED: ICD-10-CM

## 2024-01-11 LAB
ANION GAP SERPL CALCULATED.3IONS-SCNC: 9 MMOL/L (ref 7–15)
BUN SERPL-MCNC: 20.8 MG/DL (ref 8–23)
CALCIUM SERPL-MCNC: 9.8 MG/DL (ref 8.8–10.2)
CHLORIDE SERPL-SCNC: 102 MMOL/L (ref 98–107)
CHOLEST SERPL-MCNC: 121 MG/DL
CREAT SERPL-MCNC: 0.93 MG/DL (ref 0.67–1.17)
DEPRECATED HCO3 PLAS-SCNC: 28 MMOL/L (ref 22–29)
EGFRCR SERPLBLD CKD-EPI 2021: 81 ML/MIN/1.73M2
FASTING STATUS PATIENT QL REPORTED: NORMAL
GLUCOSE SERPL-MCNC: 92 MG/DL (ref 70–99)
HDLC SERPL-MCNC: 47 MG/DL
LDLC SERPL CALC-MCNC: 62 MG/DL
NONHDLC SERPL-MCNC: 74 MG/DL
POTASSIUM SERPL-SCNC: 4.7 MMOL/L (ref 3.4–5.3)
SODIUM SERPL-SCNC: 139 MMOL/L (ref 135–145)
TRIGL SERPL-MCNC: 62 MG/DL

## 2024-01-11 PROCEDURE — 80061 LIPID PANEL: CPT | Mod: ORL | Performed by: FAMILY MEDICINE

## 2024-01-11 PROCEDURE — 80048 BASIC METABOLIC PNL TOTAL CA: CPT | Mod: ORL | Performed by: FAMILY MEDICINE

## 2024-06-06 ENCOUNTER — LAB REQUISITION (OUTPATIENT)
Dept: LAB | Facility: CLINIC | Age: 84
End: 2024-06-06
Payer: MEDICARE

## 2024-06-06 DIAGNOSIS — E78.5 HYPERLIPIDEMIA, UNSPECIFIED: ICD-10-CM

## 2024-06-06 DIAGNOSIS — Z12.5 ENCOUNTER FOR SCREENING FOR MALIGNANT NEOPLASM OF PROSTATE: ICD-10-CM

## 2024-06-06 DIAGNOSIS — I10 ESSENTIAL (PRIMARY) HYPERTENSION: ICD-10-CM

## 2024-06-06 PROCEDURE — 80048 BASIC METABOLIC PNL TOTAL CA: CPT | Mod: ORL | Performed by: FAMILY MEDICINE

## 2024-06-06 PROCEDURE — G0103 PSA SCREENING: HCPCS | Mod: ORL | Performed by: FAMILY MEDICINE

## 2024-06-06 PROCEDURE — 80061 LIPID PANEL: CPT | Mod: ORL | Performed by: FAMILY MEDICINE

## 2024-06-07 LAB
ANION GAP SERPL CALCULATED.3IONS-SCNC: 11 MMOL/L (ref 7–15)
BUN SERPL-MCNC: 15.2 MG/DL (ref 8–23)
CALCIUM SERPL-MCNC: 10.1 MG/DL (ref 8.8–10.2)
CHLORIDE SERPL-SCNC: 99 MMOL/L (ref 98–107)
CHOLEST SERPL-MCNC: 122 MG/DL
CREAT SERPL-MCNC: 0.9 MG/DL (ref 0.67–1.17)
DEPRECATED HCO3 PLAS-SCNC: 26 MMOL/L (ref 22–29)
EGFRCR SERPLBLD CKD-EPI 2021: 85 ML/MIN/1.73M2
FASTING STATUS PATIENT QL REPORTED: NORMAL
FASTING STATUS PATIENT QL REPORTED: NORMAL
GLUCOSE SERPL-MCNC: 94 MG/DL (ref 70–99)
HDLC SERPL-MCNC: 44 MG/DL
LDLC SERPL CALC-MCNC: 63 MG/DL
NONHDLC SERPL-MCNC: 78 MG/DL
POTASSIUM SERPL-SCNC: 4.1 MMOL/L (ref 3.4–5.3)
PSA SERPL DL<=0.01 NG/ML-MCNC: 1.31 NG/ML
SODIUM SERPL-SCNC: 136 MMOL/L (ref 135–145)
TRIGL SERPL-MCNC: 75 MG/DL

## 2024-06-26 ENCOUNTER — OFFICE VISIT (OUTPATIENT)
Dept: AUDIOLOGY | Facility: CLINIC | Age: 84
End: 2024-06-26
Payer: MEDICARE

## 2024-06-26 DIAGNOSIS — H90.A22 SENSORINEURAL HEARING LOSS (SNHL) OF LEFT EAR WITH RESTRICTED HEARING OF RIGHT EAR: Primary | ICD-10-CM

## 2024-06-26 PROCEDURE — 92590 PR HEARING AID EXAM MONAURAL: CPT | Mod: RT | Performed by: AUDIOLOGIST

## 2024-06-28 PROBLEM — H90.A22 SENSORINEURAL HEARING LOSS (SNHL) OF LEFT EAR WITH RESTRICTED HEARING OF RIGHT EAR: Status: ACTIVE | Noted: 2024-06-28

## 2024-06-28 NOTE — PROGRESS NOTES
AUDIOLOGY REPORT    SUBJECTIVE: Renan Lemus is a 83 year old male was seen in the Audiology Clinic at  [facility] on 6/26/24 to discuss concerns with hearing and functional communication difficulties. The patient was accompanied by their wife. Mr. Lemus ws seen at [place] 6-20-24 and audiogram indicated severe to profound sensorineural hearing loo in the left ear, and borderline normal, sloping to moderate-severe sensorineural hearing loss in the right ear. He has history of ophthalmic cancer and sudden hearing loss of the left ear.     OBJECTIVE:  Abuse Screening:  Do you feel unsafe at home or work/school? No  Do you feel threatened by someone? No  Does anyone try to keep you from having contact with others, or doing things outside of your home? No  Physical signs of abuse present? No    Patient is a hearing aid candidate. Patient would like to move forward with a hearing aid evaluation today. Therefore, the patient was presented with different options for amplification to help aid in communication. Discussed styles, levels of technology and monaural vs. binaural fitting.     The hearing aid(s) mutually chosen were:  Right: Resound Nexia 5 [id number] EDE  COLOR: sparkling silver  BATTERY SIZE: rechargeable  EARMOLD/TIPS: generic dome size to be determined at fitting appointment   CANAL/ LENGTH: 1 MM    ASSESSMENT:     ICD-10-CM    1. 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 The 45 day trial period was explained to patient. The patient was given a copy of the [place] [employer] consumer brochure on purchasing hearing instruments. Patient risk factors have been provided to the patient in writing prior to the sale of the hearing aid per FDA regulation. The risk factors are also available in the User Instructional Booklet to be presented  on the day of the hearing aid fitting. Hearing aid(s) ordered. Hearing aid evaluation completed.    PLAN: Renan is scheduled to return 8-14-24 for a hearing aid fitting and programming appointment. Purchase agreement will be completed on that date. Please contact this clinic with any questions or concerns.      Stephanie Lin, [facility]-A  [place] Licensed Audiologist [phone]

## 2024-08-14 ENCOUNTER — OFFICE VISIT (OUTPATIENT)
Dept: AUDIOLOGY | Facility: CLINIC | Age: 84
End: 2024-08-14
Payer: MEDICARE

## 2024-08-14 DIAGNOSIS — H90.A22 SENSORINEURAL HEARING LOSS (SNHL) OF LEFT EAR WITH RESTRICTED HEARING OF RIGHT EAR: Primary | ICD-10-CM

## 2024-08-14 PROCEDURE — V5257 HEARING AID, DIGIT, MON, BTE: HCPCS | Mod: RT | Performed by: AUDIOLOGIST

## 2024-08-14 PROCEDURE — V5241 DISPENSING FEE, MONAURAL: HCPCS | Mod: RT | Performed by: AUDIOLOGIST

## 2024-08-14 PROCEDURE — V5011 HEARING AID FITTING/CHECKING: HCPCS | Performed by: AUDIOLOGIST

## 2024-08-14 PROCEDURE — V5020 CONFORMITY EVALUATION: HCPCS | Performed by: AUDIOLOGIST

## 2024-08-14 NOTE — PROGRESS NOTES
AUDIOLOGY REPORT    SUBJECTIVE: Renan Lemus, a 83 year old male, was seen in the Audiology Clinic at [facility] today for a Right hearing aid fitting. Previous results from [facility] (dated 6-20-24) revealed an asymmetrical sensorineural hearing loss (borderline normal, sloping to moderate-severe sensorineural hearing loss in the right ear, and severe to profound sensorineural hearing loss in the left ear). He has history of ophthalmic cancer and sudden hearing loss in the left ear.      OBJECTIVE:  Prior to fitting, a hearing aid check was performed to ensure device functionality. The hearing aid conformity evaluation was completed.The hearing aids were placed and they provided a good fit. Real-ear-probe-microphone measurements were completed on the Adore Me system and were a good match to NAL-NL2 target with soft sounds audible, moderate sounds comfortable, and loud sounds below discomfort. UCLs are verified through maximum power output measures and demonstrate appropriate limiting of loud inputs. Mr. Lemus was oriented to proper hearing aid use, care, cleaning (no water, dry brush), use of , aid insertion/removal, user booklet, warranty information, storage cases, and other hearing aid details. The patient confirmed understanding of hearing aid use and care, and showed proper insertion of hearing aid and  use while in the office today. Mr. Lemus reported good volume and sound quality today.    EAR(S) FIT: Right  MA HEARING AID MAKE: Right: Resound; Left:      MA HEARING AID MODEL #: Right: Nexia 5 [id number]; Left:    HEARING AID STYLE: Right: EDE; Left:    DOME SIZE: Right:  5 mm closed; Left::      LENGTH: Right:  Rakel 1; Left:     SERIAL NUMBERS: Right: [id number]; Left:    WARRANTY END DATE: Right: 8/26/2026; Left::        Hearing aid cost: $1105.00       ASSESSMENT: Monaural (right ear only) hearing aid fitting completed today. Verification  measures were performed. The 45 day trial period was explained to patient, and they expressed understanding. Mr. Lemus signed the Hearing Aid Purchase Agreement and was given a copy, as well as details on his hearing aids. Patient was counseled that exact out of pocket amounts cannot be determined for hearing aid claims being sent to insurance. Any insurance coverage information presented to the patient is an estimate only, and is not a guarantee of payment. Patient has been advised to check with their own insurance.    PLAN: Mr. Lemus will return for follow-up 9-3-24 for a hearing aid review appointment. He expressed verbal understanding of this information and plan. Please call this clinic with questions regarding today s appointment.    Stephanie Lin, [facility]-A  [place] Licensed Audiologist [phone]

## 2024-08-14 NOTE — PATIENT INSTRUCTIONS
Today you were fit with new hearing aids(s).Your hearing aids were adjusted according to your hearing loss, and the adjustments verified in your ear. The following are important points to remember:  Red - Right, Blue - Left  Open the battery door to turn off the hearing aids whenever you are not using them (i.e., when you go to sleep, bathe, swim, etc.).  Otherwise, the battery will drain and you will go through batteries faster.  Remember to clean your hearing aids every day after use.  Wax accumulation can decrease hearing aid performance, lead to frequent repairs, and reduce hearing aid durability.  Wear your hearing instruments as much as you can so that your brain learns to process the sound you will hear through them. This process is called acclimatization.    What to expect when using your hearing aids  In the early days or weeks, some sounds may seem too loud, too soft or not natural. This is normal. Notice when sounds seem wrong. Write down any issues or concerns. This will help your audiologist (hearing specialist)  tune  your hearing aid at your next visit.   Other things to note as you adjust to your new hearing aids:  Hearing aids will not restore your hearing to  normal  levels.  Hearing aids are designed to improve the ability to understand speech.  Hearing aids will not block background noise, but they may reduce its impact on speech.  Noisy situations that are hard for others may also be hard for you.  Sounds that are so loud they cause discomfort for others may do the same for you.  Normal loud sounds should not be uncomfortably loud.    Battery use and warnings  Before you replace an old battery, peel off the sticker of the new battery and set it out for 2 to 5 minutes.  Place the battery with the negative side (bump) into the cup and the positive side (flat) up.  Open the battery door when not using your hearing aid to save on battery life.  Warning:  Batteries are dangerous if swallowed; Never  put batteries in your mouth.    Keep out of reach of children and pets.  Throw away batteries in the trash or through a recycling program.  Never let children handle batteries.  Do not keep batteries near medicine.  If someone swallows a battery, call the [employer] Hotline at [phone].    Return in 2-3 weeks for a hearing aid check appointment

## 2024-08-19 ENCOUNTER — TELEPHONE (OUTPATIENT)
Dept: AUDIOLOGY | Facility: CLINIC | Age: 84
End: 2024-08-19
Payer: MEDICARE

## 2024-08-19 NOTE — TELEPHONE ENCOUNTER
Answered questions re: drop use in ears, ear itching, and dome placement in ears. Did not advise use of any drops/medication in ears at this time; patient did endorse occasional itch in the amplified ear once or twice per day. Advised adjustment of dome placement in ear when this occurs. Patient denied otorrhea or otalgia; encouraged him to seek care from primary care if these latter symptoms should occur. Patient expressed verbal understanding of this information and plan.    Stephanie Lin, [facility]-A  [place] Licensed Audiologist [address] [place] [address]    Phone Message    May a detailed message be left on voicemail: yes     Reason for Call: Other: Pt is requesting to speak with provider regarding his hearing aids. He states that is ear has been itching and is wondering if he can get some ear drops for night time. Please call to discuss. Did confirm phone number. Thanks     Action Taken: Message routed to:  Other: AUDIO    Travel Screening: Not Applicable     Date of Service:

## 2024-09-03 ENCOUNTER — OFFICE VISIT (OUTPATIENT)
Dept: AUDIOLOGY | Facility: CLINIC | Age: 84
End: 2024-09-03
Payer: MEDICARE

## 2024-09-03 DIAGNOSIS — H90.A22 SENSORINEURAL HEARING LOSS (SNHL) OF LEFT EAR WITH RESTRICTED HEARING OF RIGHT EAR: Primary | ICD-10-CM

## 2024-09-03 PROCEDURE — V5299 HEARING SERVICE: HCPCS | Performed by: AUDIOLOGIST

## 2024-09-03 NOTE — PROGRESS NOTES
AUDIOLOGY REPORT    SUBJECTIVE:Renan Lemus is a 83 year old male who was seen in the Audiology Clinic at the [facility] on 9/3/2024  for a follow-up check regarding the fitting of new hearing aids. Previous results from [place] have revealed asymmetrical sensorineural hearing loss, worse in the left ear than in the right ear, with poor word recognition ability present in the left ear. The patient has been seen previously in this clinic and was fit monaurally (right ear only) with a Resound Nexia 5 [id number] EDE on 8-14-24. Mr. Lemus reports improved speech understanding with the devices, but had some difficulty with charging while on vacation in a rental house, and thinks that the sound quality has diminished/is not as powerful since he was fit.     OBJECTIVE:   The device was checked; both the dome and the  were encased in cerumen. Following cleaning, the device yielded a strong signal, free from distortion. The process for removing the dome and exchanging the wax protection was demonstrated; Mr. Lemus was not able to perform this task independently without his glasses, which were not available to him for this appointment. He has significant documented visual issues, and may require assistance with this in the future. Otoscopy of the right ear revealed a partial cerumen occlusion; while the cerumen is not blocking the ear canal, it may be sufficient to impair functionality of the hearing aid. He noted significantly improved sound quality once cleaning was completed. He may benefit from regular otoscopy/ear cleaning from primary care. No programming changes were requested or made.    Reviewed 45 day trial period, care, cleaning (no water, dry brush), use of , aid insertion/removal, volume adjustment, user booklet, warranty information, storage cases, and other hearing aid details.     No charge visit today (in warranty hearing aid check).    ASSESSMENT: A  follow-up appointment for hearing aid fitting was completed today. Changes to hearing aid were completed as outlined above.   PLAN:Renan will return for follow-up as needed; he is aware that his trial period ends 9-26-24, and any returns/exchanges need to be made in writing prior to that date. The drop off procedure for items requiring repair and methods for requesting hearing aid supplies were discussed. Mr. Lemus expressed verbal understanding of this information and plan. Please call this clinic with any questions regarding today s appointment.    Samm Lin., [facility]-A  [place] Licensed Audiologist [phone]

## 2024-09-04 ENCOUNTER — TELEPHONE (OUTPATIENT)
Dept: AUDIOLOGY | Facility: CLINIC | Age: 84
End: 2024-09-04
Payer: MEDICARE

## 2024-09-04 NOTE — TELEPHONE ENCOUNTER
Current stock of patient's dome size were dispensed yesterday at patient's IRP appointment; he is unable to locate them. Janay Newsome will be asked to mail additional domes to the patient's address on file. He expressed verbal understanding of this information and plan.    Stephanie Lin, [facility]-A  [place] Licensed Audiologist [id number]      [facility] Call Center    Phone Message    May a detailed message be left on voicemail: yes     Reason for Call: Other: Per pt he is need of replacement domes. Pt would like to pick them up. Please call to discuss. Thank you     Action Taken: Message routed to:  [facility] AUDIO    Travel Screening: Not Applicable     Date of Service:

## 2024-10-20 ENCOUNTER — LAB REQUISITION (OUTPATIENT)
Dept: LAB | Facility: CLINIC | Age: 84
End: 2024-10-20
Payer: MEDICARE

## 2024-10-20 DIAGNOSIS — J20.9 ACUTE BRONCHITIS, UNSPECIFIED: ICD-10-CM

## 2024-10-20 PROCEDURE — 87205 SMEAR GRAM STAIN: CPT | Mod: ORL | Performed by: FAMILY MEDICINE

## 2024-10-22 LAB
BACTERIA SPT CULT: NORMAL
GRAM STAIN RESULT: NORMAL

## 2024-10-29 ENCOUNTER — LAB REQUISITION (OUTPATIENT)
Dept: LAB | Facility: CLINIC | Age: 84
End: 2024-10-29
Payer: MEDICARE

## 2024-10-29 DIAGNOSIS — R60.9 EDEMA, UNSPECIFIED: ICD-10-CM

## 2024-10-29 PROCEDURE — 83880 ASSAY OF NATRIURETIC PEPTIDE: CPT | Mod: ORL | Performed by: FAMILY MEDICINE

## 2024-10-29 PROCEDURE — 80048 BASIC METABOLIC PNL TOTAL CA: CPT | Mod: ORL | Performed by: FAMILY MEDICINE

## 2024-10-30 LAB
ANION GAP SERPL CALCULATED.3IONS-SCNC: 13 MMOL/L (ref 7–15)
BUN SERPL-MCNC: 21.8 MG/DL (ref 8–23)
CALCIUM SERPL-MCNC: 9.7 MG/DL (ref 8.8–10.4)
CHLORIDE SERPL-SCNC: 96 MMOL/L (ref 98–107)
CREAT SERPL-MCNC: 0.91 MG/DL (ref 0.67–1.17)
EGFRCR SERPLBLD CKD-EPI 2021: 83 ML/MIN/1.73M2
GLUCOSE SERPL-MCNC: 95 MG/DL (ref 70–99)
HCO3 SERPL-SCNC: 27 MMOL/L (ref 22–29)
NT-PROBNP SERPL-MCNC: 2066 PG/ML (ref 0–1800)
POTASSIUM SERPL-SCNC: 3.7 MMOL/L (ref 3.4–5.3)
SODIUM SERPL-SCNC: 136 MMOL/L (ref 135–145)

## 2024-11-12 ENCOUNTER — LAB REQUISITION (OUTPATIENT)
Dept: LAB | Facility: CLINIC | Age: 84
End: 2024-11-12
Payer: MEDICARE

## 2024-11-12 DIAGNOSIS — R60.9 EDEMA, UNSPECIFIED: ICD-10-CM

## 2024-11-12 PROCEDURE — 83880 ASSAY OF NATRIURETIC PEPTIDE: CPT | Mod: ORL | Performed by: FAMILY MEDICINE

## 2024-11-12 PROCEDURE — 80048 BASIC METABOLIC PNL TOTAL CA: CPT | Mod: ORL | Performed by: FAMILY MEDICINE

## 2024-11-13 LAB
ANION GAP SERPL CALCULATED.3IONS-SCNC: 12 MMOL/L (ref 7–15)
BUN SERPL-MCNC: 23.1 MG/DL (ref 8–23)
CALCIUM SERPL-MCNC: 9.5 MG/DL (ref 8.8–10.4)
CHLORIDE SERPL-SCNC: 100 MMOL/L (ref 98–107)
CREAT SERPL-MCNC: 0.83 MG/DL (ref 0.67–1.17)
EGFRCR SERPLBLD CKD-EPI 2021: 86 ML/MIN/1.73M2
GLUCOSE SERPL-MCNC: 95 MG/DL (ref 70–99)
HCO3 SERPL-SCNC: 25 MMOL/L (ref 22–29)
NT-PROBNP SERPL-MCNC: 2068 PG/ML (ref 0–1800)
POTASSIUM SERPL-SCNC: 3.9 MMOL/L (ref 3.4–5.3)
SODIUM SERPL-SCNC: 137 MMOL/L (ref 135–145)

## 2024-12-06 ENCOUNTER — LAB REQUISITION (OUTPATIENT)
Dept: LAB | Facility: CLINIC | Age: 84
End: 2024-12-06
Payer: MEDICARE

## 2024-12-06 DIAGNOSIS — E78.5 HYPERLIPIDEMIA, UNSPECIFIED: ICD-10-CM

## 2024-12-06 DIAGNOSIS — I10 ESSENTIAL (PRIMARY) HYPERTENSION: ICD-10-CM

## 2024-12-06 LAB
ANION GAP SERPL CALCULATED.3IONS-SCNC: 11 MMOL/L (ref 7–15)
BUN SERPL-MCNC: 19.7 MG/DL (ref 8–23)
CALCIUM SERPL-MCNC: 9.7 MG/DL (ref 8.8–10.4)
CHLORIDE SERPL-SCNC: 103 MMOL/L (ref 98–107)
CHOLEST SERPL-MCNC: 111 MG/DL
CREAT SERPL-MCNC: 0.85 MG/DL (ref 0.67–1.17)
EGFRCR SERPLBLD CKD-EPI 2021: 86 ML/MIN/1.73M2
FASTING STATUS PATIENT QL REPORTED: NORMAL
FASTING STATUS PATIENT QL REPORTED: NORMAL
GLUCOSE SERPL-MCNC: 93 MG/DL (ref 70–99)
HCO3 SERPL-SCNC: 26 MMOL/L (ref 22–29)
HDLC SERPL-MCNC: 45 MG/DL
LDLC SERPL CALC-MCNC: 55 MG/DL
NONHDLC SERPL-MCNC: 66 MG/DL
POTASSIUM SERPL-SCNC: 4.1 MMOL/L (ref 3.4–5.3)
SODIUM SERPL-SCNC: 140 MMOL/L (ref 135–145)
TRIGL SERPL-MCNC: 53 MG/DL

## 2024-12-06 PROCEDURE — 80048 BASIC METABOLIC PNL TOTAL CA: CPT | Mod: ORL | Performed by: FAMILY MEDICINE

## 2024-12-06 PROCEDURE — 80061 LIPID PANEL: CPT | Mod: ORL | Performed by: FAMILY MEDICINE

## 2025-03-15 ENCOUNTER — HEALTH MAINTENANCE LETTER (OUTPATIENT)
Age: 85
End: 2025-03-15

## 2025-04-02 ENCOUNTER — LAB REQUISITION (OUTPATIENT)
Dept: LAB | Facility: CLINIC | Age: 85
End: 2025-04-02
Payer: MEDICARE

## 2025-04-02 DIAGNOSIS — I50.32 CHRONIC DIASTOLIC (CONGESTIVE) HEART FAILURE (H): ICD-10-CM

## 2025-04-02 LAB
ANION GAP SERPL CALCULATED.3IONS-SCNC: 11 MMOL/L (ref 7–15)
BUN SERPL-MCNC: 20.1 MG/DL (ref 8–23)
CALCIUM SERPL-MCNC: 9.9 MG/DL (ref 8.8–10.4)
CHLORIDE SERPL-SCNC: 103 MMOL/L (ref 98–107)
CREAT SERPL-MCNC: 0.83 MG/DL (ref 0.67–1.17)
EGFRCR SERPLBLD CKD-EPI 2021: 86 ML/MIN/1.73M2
GLUCOSE SERPL-MCNC: 96 MG/DL (ref 70–99)
HCO3 SERPL-SCNC: 26 MMOL/L (ref 22–29)
POTASSIUM SERPL-SCNC: 4 MMOL/L (ref 3.4–5.3)
SODIUM SERPL-SCNC: 140 MMOL/L (ref 135–145)

## 2025-04-02 PROCEDURE — 80048 BASIC METABOLIC PNL TOTAL CA: CPT | Mod: ORL | Performed by: FAMILY MEDICINE

## 2025-06-06 ENCOUNTER — LAB REQUISITION (OUTPATIENT)
Dept: LAB | Facility: CLINIC | Age: 85
End: 2025-06-06
Payer: MEDICARE

## 2025-06-06 DIAGNOSIS — E78.5 HYPERLIPIDEMIA, UNSPECIFIED: ICD-10-CM

## 2025-06-06 DIAGNOSIS — I10 ESSENTIAL (PRIMARY) HYPERTENSION: ICD-10-CM

## 2025-06-06 LAB
ANION GAP SERPL CALCULATED.3IONS-SCNC: 11 MMOL/L (ref 7–15)
BUN SERPL-MCNC: 22.4 MG/DL (ref 8–23)
CALCIUM SERPL-MCNC: 9.5 MG/DL (ref 8.8–10.4)
CHLORIDE SERPL-SCNC: 103 MMOL/L (ref 98–107)
CHOLEST SERPL-MCNC: 107 MG/DL
CREAT SERPL-MCNC: 0.98 MG/DL (ref 0.67–1.17)
EGFRCR SERPLBLD CKD-EPI 2021: 76 ML/MIN/1.73M2
FASTING STATUS PATIENT QL REPORTED: NORMAL
FASTING STATUS PATIENT QL REPORTED: NORMAL
GLUCOSE SERPL-MCNC: 94 MG/DL (ref 70–99)
HCO3 SERPL-SCNC: 26 MMOL/L (ref 22–29)
HDLC SERPL-MCNC: 42 MG/DL
LDLC SERPL CALC-MCNC: 54 MG/DL
NONHDLC SERPL-MCNC: 65 MG/DL
POTASSIUM SERPL-SCNC: 3.9 MMOL/L (ref 3.4–5.3)
SODIUM SERPL-SCNC: 140 MMOL/L (ref 135–145)
TRIGL SERPL-MCNC: 54 MG/DL

## 2025-06-06 PROCEDURE — 80061 LIPID PANEL: CPT | Mod: ORL | Performed by: FAMILY MEDICINE

## 2025-06-06 PROCEDURE — 80048 BASIC METABOLIC PNL TOTAL CA: CPT | Mod: ORL | Performed by: FAMILY MEDICINE

## 2025-08-04 ENCOUNTER — LAB REQUISITION (OUTPATIENT)
Dept: LAB | Facility: CLINIC | Age: 85
End: 2025-08-04
Payer: MEDICARE

## 2025-08-04 DIAGNOSIS — N28.9 DISORDER OF KIDNEY AND URETER, UNSPECIFIED: ICD-10-CM

## 2025-08-04 PROCEDURE — 80048 BASIC METABOLIC PNL TOTAL CA: CPT | Mod: ORL | Performed by: FAMILY MEDICINE

## 2025-08-05 LAB
ANION GAP SERPL CALCULATED.3IONS-SCNC: 11 MMOL/L (ref 7–15)
BUN SERPL-MCNC: 25.9 MG/DL (ref 8–23)
CALCIUM SERPL-MCNC: 10 MG/DL (ref 8.8–10.4)
CHLORIDE SERPL-SCNC: 102 MMOL/L (ref 98–107)
CREAT SERPL-MCNC: 0.95 MG/DL (ref 0.67–1.17)
EGFRCR SERPLBLD CKD-EPI 2021: 79 ML/MIN/1.73M2
GLUCOSE SERPL-MCNC: 90 MG/DL (ref 70–99)
HCO3 SERPL-SCNC: 24 MMOL/L (ref 22–29)
POTASSIUM SERPL-SCNC: 4.2 MMOL/L (ref 3.4–5.3)
SODIUM SERPL-SCNC: 137 MMOL/L (ref 135–145)

## 2025-08-19 ENCOUNTER — LAB REQUISITION (OUTPATIENT)
Dept: LAB | Facility: CLINIC | Age: 85
End: 2025-08-19
Payer: MEDICARE

## 2025-08-19 DIAGNOSIS — I10 ESSENTIAL (PRIMARY) HYPERTENSION: ICD-10-CM

## 2025-08-20 LAB
ANION GAP SERPL CALCULATED.3IONS-SCNC: 11 MMOL/L (ref 7–15)
BUN SERPL-MCNC: 35.4 MG/DL (ref 8–23)
CALCIUM SERPL-MCNC: 9.8 MG/DL (ref 8.8–10.4)
CHLORIDE SERPL-SCNC: 99 MMOL/L (ref 98–107)
CREAT SERPL-MCNC: 1.08 MG/DL (ref 0.67–1.17)
EGFRCR SERPLBLD CKD-EPI 2021: 68 ML/MIN/1.73M2
GLUCOSE SERPL-MCNC: 103 MG/DL (ref 70–99)
HCO3 SERPL-SCNC: 26 MMOL/L (ref 22–29)
POTASSIUM SERPL-SCNC: 4.5 MMOL/L (ref 3.4–5.3)
SODIUM SERPL-SCNC: 136 MMOL/L (ref 135–145)